# Patient Record
Sex: MALE | Race: BLACK OR AFRICAN AMERICAN | NOT HISPANIC OR LATINO | Employment: FULL TIME | URBAN - METROPOLITAN AREA
[De-identification: names, ages, dates, MRNs, and addresses within clinical notes are randomized per-mention and may not be internally consistent; named-entity substitution may affect disease eponyms.]

---

## 2018-04-11 ENCOUNTER — HOSPITAL ENCOUNTER (OUTPATIENT)
Dept: RADIOLOGY | Facility: HOSPITAL | Age: 38
Discharge: HOME | End: 2018-04-11
Attending: INTERNAL MEDICINE
Payer: COMMERCIAL

## 2018-04-11 ENCOUNTER — OFFICE VISIT (OUTPATIENT)
Dept: PULMONOLOGY | Facility: CLINIC | Age: 38
End: 2018-04-11
Payer: COMMERCIAL

## 2018-04-11 VITALS
HEIGHT: 71 IN | HEART RATE: 50 BPM | OXYGEN SATURATION: 99 % | DIASTOLIC BLOOD PRESSURE: 86 MMHG | SYSTOLIC BLOOD PRESSURE: 122 MMHG | WEIGHT: 201.75 LBS | BODY MASS INDEX: 28.24 KG/M2

## 2018-04-11 DIAGNOSIS — J45.909 CHILDHOOD ASTHMA, UNSPECIFIED ASTHMA SEVERITY, UNSPECIFIED WHETHER COMPLICATED, UNSPECIFIED WHETHER PERSISTENT: ICD-10-CM

## 2018-04-11 DIAGNOSIS — J45.901 EXACERBATION OF ASTHMA, UNSPECIFIED ASTHMA SEVERITY, UNSPECIFIED WHETHER PERSISTENT: ICD-10-CM

## 2018-04-11 DIAGNOSIS — J45.901 EXACERBATION OF ASTHMA, UNSPECIFIED ASTHMA SEVERITY, UNSPECIFIED WHETHER PERSISTENT: Primary | ICD-10-CM

## 2018-04-11 DIAGNOSIS — J30.2 SEASONAL ALLERGIC RHINITIS, UNSPECIFIED CHRONICITY, UNSPECIFIED TRIGGER: ICD-10-CM

## 2018-04-11 DIAGNOSIS — J45.21 MILD INTERMITTENT ASTHMA WITH ACUTE EXACERBATION: ICD-10-CM

## 2018-04-11 PROCEDURE — 71046 X-RAY EXAM CHEST 2 VIEWS: CPT | Mod: 26,,, | Performed by: RADIOLOGY

## 2018-04-11 PROCEDURE — 99204 OFFICE O/P NEW MOD 45 MIN: CPT | Mod: S$GLB,,, | Performed by: INTERNAL MEDICINE

## 2018-04-11 PROCEDURE — 71046 X-RAY EXAM CHEST 2 VIEWS: CPT | Mod: TC,FY,PO

## 2018-04-11 PROCEDURE — 99999 PR PBB SHADOW E&M-NEW PATIENT-LVL III: CPT | Mod: PBBFAC,,, | Performed by: INTERNAL MEDICINE

## 2018-04-11 RX ORDER — LOSARTAN POTASSIUM 100 MG/1
10 TABLET ORAL DAILY
COMMUNITY
End: 2019-10-19 | Stop reason: SDUPTHER

## 2018-04-11 RX ORDER — FLUTICASONE PROPIONATE 50 MCG
1 SPRAY, SUSPENSION (ML) NASAL DAILY
Qty: 16 G | Refills: 4 | Status: SHIPPED | OUTPATIENT
Start: 2018-04-11 | End: 2019-10-19 | Stop reason: SDUPTHER

## 2018-04-11 RX ORDER — PREDNISONE 10 MG/1
TABLET ORAL
Qty: 21 TABLET | Refills: 0 | Status: SHIPPED | OUTPATIENT
Start: 2018-04-11 | End: 2018-05-31 | Stop reason: ALTCHOICE

## 2018-04-11 RX ORDER — MULTIVITAMIN
1 TABLET ORAL DAILY
COMMUNITY

## 2018-04-11 RX ORDER — ALBUTEROL SULFATE 90 UG/1
2 AEROSOL, METERED RESPIRATORY (INHALATION) EVERY 6 HOURS PRN
Qty: 18 G | Refills: 0 | Status: SHIPPED | OUTPATIENT
Start: 2018-04-11 | End: 2019-09-05 | Stop reason: SDUPTHER

## 2018-04-11 NOTE — PROGRESS NOTES
Initial Outpatient Pulmonary Evaluation       SUBJECTIVE:     History of Present Illness:    Patient is a 37 y.o. male presenting for worsening wheezing every night for the last 3 weeks, sob , chest tightness, cough with clear sputum.     Cold air , night time, exercise can precipitate his wheezing.     No recent relocation , Pet exposure , URI.     + nasal congestion, rinorrhea, sneezing, itchy eyes.     + Seasonal allergies this time of the year.     Last time he used Albuterol in 2001.     Child hx of asthma.     Works as .     Never smoker.     No Fhx of asthma.     Chief Complaint   Patient presents with    Wheezing    history of asthma     Review of patient's allergies indicates:  No Known Allergies    Current Outpatient Prescriptions   Medication Sig Dispense Refill    losartan (COZAAR) 100 MG tablet Take 10 mg by mouth once daily.      multivitamin (THERAGRAN) per tablet Take 1 tablet by mouth once daily.      albuterol 90 mcg/actuation inhaler Inhale 2 puffs into the lungs every 6 (six) hours as needed for Wheezing. Rescue 18 g 0    fluticasone (FLONASE) 50 mcg/actuation nasal spray 1 spray (50 mcg total) by Each Nare route once daily. 16 g 4    fluticasone furoate (ARNUITY ELLIPTA) 100 mcg/actuation DsDv Inhale 100 mcg into the lungs once daily. Controller 30 each 3     No current facility-administered medications for this visit.        Past Medical History:   Diagnosis Date    Asthma     Childhood    Hepatitis B 2015    Hyperlipidemia 2011    Hypertension      Past Surgical History:   Procedure Laterality Date    KNEE ARTHROSCOPY Right 1995    LIVER BIOPSY  2017     Family History   Problem Relation Age of Onset    Hypertension Mother     Diabetes Father     Hypertension Father     Diabetes Maternal Grandmother     Heart failure Maternal Grandmother      Social History   Substance Use Topics    Smoking status: Never Smoker     "Smokeless tobacco: Never Used    Alcohol use No        Review of Systems:  Review of Systems   Constitutional: Negative for chills and fever.   HENT: Positive for congestion, postnasal drip, rhinorrhea and sneezing.    Eyes: Positive for itching.   Respiratory: Positive for cough, shortness of breath and wheezing.    Cardiovascular: Negative for chest pain.   Gastrointestinal: Negative for abdominal pain.        Hep B chronic sp liver bx last Nov.    Endocrine: Negative.    Genitourinary: Negative for hematuria.   Musculoskeletal: Positive for back pain. Negative for gait problem.   Skin: Negative for color change.   Allergic/Immunologic: Positive for environmental allergies. Negative for immunocompromised state.   Neurological: Negative for seizures.   Hematological: Negative for adenopathy.   Psychiatric/Behavioral: The patient is not nervous/anxious.        OBJECTIVE:     Vital Signs (Most Recent)  Pulse: (!) 50 (04/11/18 1452)  BP: 122/86 (04/11/18 1452)  SpO2: 99 % (04/11/18 1452)  5' 11" (1.803 m)  91.5 kg (201 lb 11.5 oz)     Physical Exam:  Physical Exam   Constitutional: He is oriented to person, place, and time. He appears well-developed and well-nourished.   HENT:   Head: Normocephalic and atraumatic.   Eyes: EOM are normal.   Neck: Neck supple.   Cardiovascular: Normal rate and regular rhythm.    Pulmonary/Chest: Effort normal. No respiratory distress. He has wheezes. He has no rales. He exhibits no tenderness.   Abdominal: Soft. He exhibits no distension.   Musculoskeletal: He exhibits no edema or deformity.   Neurological: He is alert and oriented to person, place, and time.   Skin: Skin is warm.   Psychiatric: He has a normal mood and affect.       Laboratory    Lab Results   Component Value Date    WBC 4.25 (L) 07/06/2010    HGB 14.1 07/06/2010    HCT 43.0 07/06/2010    MCV 85.8 07/06/2010     07/06/2010     BMP  Lab Results   Component Value Date     07/06/2010    K 4.5 07/06/2010    "  07/06/2010    CO2 26 07/06/2010    BUN 16 07/06/2010    CREATININE 0.9 07/06/2010    CALCIUM 9.5 07/06/2010    ANIONGAP 15 07/06/2010    ESTGFRAFRICA >60 07/06/2010    EGFRNONAA >60 07/06/2010     BNP  @LABRCNTIP(BNP,BNPTRIAGEBLO)@  Lab Results   Component Value Date    TSH 0.540 05/19/2010     ABG  @LABRCNTIP(PH,PO2,PCO2,HCO3,BE)@    Diagnostic Results:  Liver, core needle biopsy:                                               - Chronic hepatitis with minimal activity and no significant           fibrosis, consistent with chronic hepatitis B, see Comment and          synoptic.                          ASSESSMENT/PLAN:     Exacerbation of asthma, unspecified asthma severity, unspecified whether persistent  -     fluticasone furoate (ARNUITY ELLIPTA) 100 mcg/actuation DsDv; Inhale 100 mcg into the lungs once daily. Controller  Dispense: 30 each; Refill: 3  -     albuterol 90 mcg/actuation inhaler; Inhale 2 puffs into the lungs every 6 (six) hours as needed for Wheezing. Rescue  Dispense: 18 g; Refill: 0  -     X-Ray Chest PA And Lateral; Future; Expected date: 04/11/2018    Mild intermittent asthma with acute exacerbation  -     fluticasone furoate (ARNUITY ELLIPTA) 100 mcg/actuation DsDv; Inhale 100 mcg into the lungs once daily. Controller  Dispense: 30 each; Refill: 3  -     albuterol 90 mcg/actuation inhaler; Inhale 2 puffs into the lungs every 6 (six) hours as needed for Wheezing. Rescue  Dispense: 18 g; Refill: 0  -     Complete PFT with bronchodilator; Future; Expected date: 05/30/2018    Seasonal allergic rhinitis, unspecified chronicity, unspecified trigger  -     fluticasone (FLONASE) 50 mcg/actuation nasal spray; 1 spray (50 mcg total) by Each Nare route once daily.  Dispense: 16 g; Refill: 4    Childhood asthma, unspecified asthma severity, unspecified whether complicated, unspecified whether persistent  -     Complete PFT with bronchodilator; Future; Expected date: 05/30/2018      Follow-up in  about 6 weeks (around 5/23/2018).    This note was prepared using voice recognition system and is likely to have sound alike errors that may have been overlooked even after proof reading.  Please call me with any questions    Discussed diagnosis, its evaluation, treatment and usual course. All questions answered.    Thank you for the courtesy of participating in the care of this patient    Elias Novak MD

## 2018-04-13 ENCOUNTER — TELEPHONE (OUTPATIENT)
Dept: PULMONOLOGY | Facility: CLINIC | Age: 38
End: 2018-04-13

## 2018-04-13 NOTE — TELEPHONE ENCOUNTER
----- Message from Elias Novak MD sent at 4/12/2018 12:22 PM CDT -----  Plz inform patient CXR was okay.   Thx

## 2018-05-31 ENCOUNTER — OFFICE VISIT (OUTPATIENT)
Dept: PULMONOLOGY | Facility: CLINIC | Age: 38
End: 2018-05-31
Payer: COMMERCIAL

## 2018-05-31 ENCOUNTER — PROCEDURE VISIT (OUTPATIENT)
Dept: PULMONOLOGY | Facility: CLINIC | Age: 38
End: 2018-05-31
Payer: COMMERCIAL

## 2018-05-31 VITALS
RESPIRATION RATE: 18 BRPM | BODY MASS INDEX: 28.14 KG/M2 | DIASTOLIC BLOOD PRESSURE: 83 MMHG | HEART RATE: 62 BPM | WEIGHT: 201 LBS | HEIGHT: 71 IN | SYSTOLIC BLOOD PRESSURE: 120 MMHG | OXYGEN SATURATION: 98 %

## 2018-05-31 DIAGNOSIS — J30.2 SEASONAL ALLERGIC RHINITIS, UNSPECIFIED TRIGGER: ICD-10-CM

## 2018-05-31 DIAGNOSIS — J45.909 CHILDHOOD ASTHMA, UNSPECIFIED ASTHMA SEVERITY, UNSPECIFIED WHETHER COMPLICATED, UNSPECIFIED WHETHER PERSISTENT: ICD-10-CM

## 2018-05-31 DIAGNOSIS — J45.990 EXERCISE-INDUCED ASTHMA: ICD-10-CM

## 2018-05-31 DIAGNOSIS — J45.30 MILD PERSISTENT ASTHMA WITHOUT COMPLICATION: Primary | ICD-10-CM

## 2018-05-31 DIAGNOSIS — J45.21 MILD INTERMITTENT ASTHMA WITH ACUTE EXACERBATION: ICD-10-CM

## 2018-05-31 LAB
BRPFT: ABNORMAL
DLCO ADJ PRE: 31.98 ML/(MIN*MMHG) (ref 27.29–41.15)
DLCO PRE: 31.98 ML/(MIN*MMHG) (ref 27.29–41.15)
DLCO SINGLE BREATH LLN: 27.29
DLCO SINGLE BREATH PRE REF: 93.4 %
DLCO SINGLE BREATH REF: 34.22
DLCOC SBVA LLN: 3.48
DLCOC SBVA PRE REF: 121.6 %
DLCOC SBVA REF: 4.69
DLCOC SINGLE BREATH LLN: 27.29
DLCOC SINGLE BREATH PRE REF: 93.4 %
DLCOC SINGLE BREATH REF: 34.22
DLCOVA LLN: 3.48
DLCOVA PRE REF: 121.6 %
DLCOVA PRE: 5.7 ML/(MIN*MMHG*L) (ref 3.48–5.89)
DLCOVA REF: 4.69
DLVAADJ PRE: 5.7 ML/(MIN*MMHG*L) (ref 3.48–5.89)
ERV LLN: 1.5
ERV PRE REF: 125.8 %
ERV PRE: 1.89 L (ref 1.5–1.5)
ERV REF: 1.5
ERVN2 LLN: 1.5
ERVN2 REF: 1.5
FEF 25 75 CHG: 37.9 %
FEF 25 75 LLN: 1.96
FEF 25 75 POST REF: 69.4 %
FEF 25 75 POST: 2.58 L/S (ref 1.96–5.47)
FEF 25 75 PRE REF: 50.4 %
FEF 25 75 PRE: 1.87 L/S (ref 1.96–5.47)
FEF 25 75 REF: 3.71
FET100 CHG: -26.6 %
FET100 POST: 12.38 SEC
FET100 PRE: 16.86 SEC
FEV1 CHG: 6.5 %
FEV1 FVC CHG: 7.4 %
FEV1 FVC LLN: 71
FEV1 FVC POST REF: 89.2 %
FEV1 FVC POST: 72.73 % (ref 71.42–91.62)
FEV1 FVC PRE REF: 83.1 %
FEV1 FVC PRE: 67.72 % (ref 71.42–91.62)
FEV1 FVC REF: 82
FEV1 LLN: 2.87
FEV1 POST REF: 96.2 %
FEV1 POST: 3.6 L (ref 2.87–4.6)
FEV1 PRE REF: 90.3 %
FEV1 PRE: 3.38 L (ref 2.87–4.6)
FEV1 REF: 3.74
FEV6 CHG: 4.6 %
FEV6 LLN: 3.52
FEV6 POST REF: 105.4 %
FEV6 POST: 4.71 L (ref 3.52–5.41)
FEV6 PRE REF: 100.7 %
FEV6 PRE: 4.5 L (ref 3.52–5.41)
FEV6 REF: 4.46
FRCN2 LLN: 2.48
FRCN2 REF: 3.46
FRCPL PRE: 4.19 L
FRCPLETH LLN: 2.48
FRCPLETH PREREF: 121 %
FRCPLETH REF: 3.46
FVC CHG: -0.8 %
FVC LLN: 3.59
FVC POST REF: 107.5 %
FVC POST: 4.94 L (ref 3.59–5.6)
FVC PRE REF: 108.4 %
FVC PRE: 4.98 L (ref 3.59–5.6)
FVC REF: 4.6
IVC PRE: 4.37 L (ref 3.59–5.6)
IVC SINGLE BREATH LLN: 3.59
IVC SINGLE BREATH PRE REF: 95.1 %
IVC SINGLE BREATH REF: 4.6
MVV LLN: 141
MVV REF: 166
PEF CHG: 13.5 %
PEF LLN: 6.81
PEF POST REF: 94.8 %
PEF POST: 9.03 L/S (ref 6.81–12.25)
PEF PRE REF: 83.5 %
PEF PRE: 7.96 L/S (ref 6.81–12.25)
PEF REF: 9.53
RAW LLN: 3.06
RAW PRE REF: 66 %
RAW PRE: 2.02 CMH2O*S/L (ref 3.06–3.06)
RAW REF: 3.06
RV LLN: 1.29
RV PRE REF: 118.4 %
RV PRE: 2.33 L (ref 1.29–2.64)
RV REF: 1.96
RVN2 LLN: 1.29
RVN2 REF: 1.96
RVN2TLCN2 LLN: 20
RVN2TLCN2 REF: 29
RVTLC LLN: 20
RVTLC PRE REF: 110.6 %
RVTLC PRE: 31.82 % (ref 19.8–37.76)
RVTLC REF: 29
TLC LLN: 6.15
TLC PRE REF: 100.1 %
TLC PRE: 7.31 L (ref 6.15–8.45)
TLC REF: 7.3
TLCN2 LLN: 6.15
TLCN2 REF: 7.3
VA PRE: 5.62 L (ref 5.62–8.35)
VA SINGLE BREATH LLN: 5.62
VA SINGLE BREATH PRE REF: 80.4 %
VA SINGLE BREATH REF: 6.99
VC LLN: 3.59
VC PRE REF: 108.4 %
VC PRE: 4.98 L (ref 3.59–5.6)
VC REF: 4.6
VCMAXN2 LLN: 3.59
VCMAXN2 REF: 4.6
VTGRAWPRE: 4.93 L

## 2018-05-31 PROCEDURE — 94729 DIFFUSING CAPACITY: CPT | Mod: S$GLB,,, | Performed by: INTERNAL MEDICINE

## 2018-05-31 PROCEDURE — 3008F BODY MASS INDEX DOCD: CPT | Mod: CPTII,S$GLB,, | Performed by: INTERNAL MEDICINE

## 2018-05-31 PROCEDURE — 99999 PR PBB SHADOW E&M-EST. PATIENT-LVL III: CPT | Mod: PBBFAC,,, | Performed by: INTERNAL MEDICINE

## 2018-05-31 PROCEDURE — 99214 OFFICE O/P EST MOD 30 MIN: CPT | Mod: 25,S$GLB,, | Performed by: INTERNAL MEDICINE

## 2018-05-31 PROCEDURE — 94726 PLETHYSMOGRAPHY LUNG VOLUMES: CPT | Mod: S$GLB,,, | Performed by: INTERNAL MEDICINE

## 2018-05-31 PROCEDURE — 94060 EVALUATION OF WHEEZING: CPT | Mod: S$GLB,,, | Performed by: INTERNAL MEDICINE

## 2018-05-31 NOTE — PROGRESS NOTES
Pulmonary Outpatient Follow Up Visit     Subjective:       Patient ID: Andrew Allen is a 37 y.o. male.    Chief Complaint: Asthma and Wheezing      HPI  37-year-old male presenting for follow-up.  He was evaluated on April 11, 2018 initially for worsening allergic rhinitis wheezing shortness of breath coughing and frequent nighttime symptoms.  He was prescribed a prednisone taper and started on inhaled steroid, albuterol as needed and nasal steroid spray.  He is using albuterol pre exercise and he is having no symptoms during exercise.  Today he feels significantly better seldomly using albuterol, nasal congestion or rhinorrhea, sneezing and postnasal drip resolved.  Review of Systems   Constitutional: Negative for fever and chills.   HENT: Negative for nosebleeds and congestion.    Eyes: Negative for redness.   Respiratory: Negative for cough, shortness of breath and asthma nighttime symptoms.    Cardiovascular: Negative for chest pain.   Genitourinary: Negative for hematuria.   Endocrine: Negative for polyphagia.    Musculoskeletal: Positive for back pain. Negative for gait problem.   Skin: Negative for rash.   Gastrointestinal: Negative for abdominal distention.         Hep B chronic sp liver bx last Nov.   Neurological: Negative for syncope.   Hematological: Negative for adenopathy.   Psychiatric/Behavioral: Negative for confusion. The patient is not nervous/anxious.          Outpatient Encounter Prescriptions as of 5/31/2018   Medication Sig Dispense Refill    albuterol 90 mcg/actuation inhaler Inhale 2 puffs into the lungs every 6 (six) hours as needed for Wheezing. Rescue 18 g 0    fluticasone (FLONASE) 50 mcg/actuation nasal spray 1 spray (50 mcg total) by Each Nare route once daily. 16 g 4    fluticasone furoate (ARNUITY ELLIPTA) 100 mcg/actuation DsDv Inhale 100 mcg into the lungs once daily. Controller 30 each 3    losartan (COZAAR) 100 MG tablet Take 10  mg by mouth once daily.      multivitamin (THERAGRAN) per tablet Take 1 tablet by mouth once daily.      [DISCONTINUED] predniSONE (DELTASONE) 10 MG tablet Prednisone 40 mg daily for 3 days then 20 mg daily for 3 days then 10 mg daily for 3 days 21 tablet 0     No facility-administered encounter medications on file as of 5/31/2018.        Objective:          Physical Exam   Constitutional: He is oriented to person, place, and time. He appears well-developed and well-nourished.   HENT:   Head: Normocephalic.   Mouth/Throat: Mallampati Score: I.   Neck: Neck supple.   Cardiovascular: Normal rate and regular rhythm.    Pulmonary/Chest: Normal expansion and effort normal.   Abdominal: Soft.   Musculoskeletal: He exhibits no edema.   Lymphadenopathy:     He has no cervical adenopathy.   Neurological: He is alert and oriented to person, place, and time.   Skin: Skin is warm.   Psychiatric: He has a normal mood and affect.       Laboratory    Lab Results   Component Value Date    WBC 4.25 (L) 07/06/2010    HGB 14.1 07/06/2010    HCT 43.0 07/06/2010    MCV 85.8 07/06/2010     07/06/2010     BMP  Lab Results   Component Value Date     07/06/2010    K 4.5 07/06/2010     07/06/2010    CO2 26 07/06/2010    BUN 16 07/06/2010    CREATININE 0.9 07/06/2010    CALCIUM 9.5 07/06/2010    ANIONGAP 15 07/06/2010    ESTGFRAFRICA >60 07/06/2010    EGFRNONAA >60 07/06/2010     BNP  @LABRCNTIP(BNP,BNPTRIAGEBLO)@  Lab Results   Component Value Date    TSH 0.540 05/19/2010       Diagnostic Results:    Chest x-ray April 11, 2018 clear lungs.  X-ray was personally reviewed    PFT May 31, 2018 small airways flow obstruction.  Mild obstruction.  No significant broncho reactivity.  PFT was personally reviewed  Assessment/Plan:   Mild persistent asthma without complication    Seasonal allergic rhinitis, unspecified trigger    Exercise-induced asthma      Continue albuterol as needed, continue Arnuity 100 mcg 1 puff  daily.    Continue on steroid nasal spray.    Patient advised to use albuterol pre exercise if needed.      Follow-up in about 3 months (around 8/31/2018).    This note was prepared using voice recognition system and is likely to have sound alike errors that may have been overlooked even after proof reading.  Please call me with any questions    Discussed diagnosis, its evaluation, treatment and usual course. All questions answered.    Thank you for the courtesy of participating in the care of this patient    Elias Novak MD

## 2019-09-05 DIAGNOSIS — J45.21 MILD INTERMITTENT ASTHMA WITH ACUTE EXACERBATION: ICD-10-CM

## 2019-09-05 DIAGNOSIS — J45.901 EXACERBATION OF ASTHMA, UNSPECIFIED ASTHMA SEVERITY, UNSPECIFIED WHETHER PERSISTENT: ICD-10-CM

## 2019-09-05 RX ORDER — ALBUTEROL SULFATE 90 UG/1
AEROSOL, METERED RESPIRATORY (INHALATION)
Qty: 18 INHALER | Refills: 0 | Status: SHIPPED | OUTPATIENT
Start: 2019-09-05 | End: 2019-10-19 | Stop reason: SDUPTHER

## 2019-10-19 ENCOUNTER — OFFICE VISIT (OUTPATIENT)
Dept: INTERNAL MEDICINE | Facility: CLINIC | Age: 39
End: 2019-10-19
Payer: COMMERCIAL

## 2019-10-19 ENCOUNTER — LAB VISIT (OUTPATIENT)
Dept: LAB | Facility: HOSPITAL | Age: 39
End: 2019-10-19
Payer: COMMERCIAL

## 2019-10-19 ENCOUNTER — LAB VISIT (OUTPATIENT)
Dept: LAB | Facility: HOSPITAL | Age: 39
End: 2019-10-19
Attending: FAMILY MEDICINE
Payer: COMMERCIAL

## 2019-10-19 VITALS
OXYGEN SATURATION: 98 % | TEMPERATURE: 97 F | DIASTOLIC BLOOD PRESSURE: 86 MMHG | HEART RATE: 71 BPM | WEIGHT: 241.63 LBS | BODY MASS INDEX: 33.83 KG/M2 | HEIGHT: 71 IN | SYSTOLIC BLOOD PRESSURE: 128 MMHG

## 2019-10-19 DIAGNOSIS — Z13.1 SCREENING FOR DIABETES MELLITUS: ICD-10-CM

## 2019-10-19 DIAGNOSIS — Z00.00 PREVENTATIVE HEALTH CARE: ICD-10-CM

## 2019-10-19 DIAGNOSIS — J45.20 MILD INTERMITTENT ASTHMA WITHOUT COMPLICATION: Chronic | ICD-10-CM

## 2019-10-19 DIAGNOSIS — Z11.4 SCREENING FOR HIV WITHOUT PRESENCE OF RISK FACTORS: ICD-10-CM

## 2019-10-19 DIAGNOSIS — Z13.220 SCREENING FOR LIPID DISORDERS: ICD-10-CM

## 2019-10-19 DIAGNOSIS — Z11.3 ROUTINE SCREENING FOR STI (SEXUALLY TRANSMITTED INFECTION): ICD-10-CM

## 2019-10-19 DIAGNOSIS — J30.1 SEASONAL ALLERGIC RHINITIS DUE TO POLLEN: ICD-10-CM

## 2019-10-19 DIAGNOSIS — Z23 NEED FOR DIPHTHERIA-TETANUS-PERTUSSIS (TDAP) VACCINE: ICD-10-CM

## 2019-10-19 DIAGNOSIS — Z23 NEED FOR 23-POLYVALENT PNEUMOCOCCAL POLYSACCHARIDE VACCINE: ICD-10-CM

## 2019-10-19 DIAGNOSIS — Z23 NEED FOR INFLUENZA VACCINATION: ICD-10-CM

## 2019-10-19 DIAGNOSIS — I10 ESSENTIAL HYPERTENSION: Chronic | ICD-10-CM

## 2019-10-19 DIAGNOSIS — Z00.00 PREVENTATIVE HEALTH CARE: Primary | ICD-10-CM

## 2019-10-19 DIAGNOSIS — B18.1 CHRONIC VIRAL HEPATITIS B: Chronic | ICD-10-CM

## 2019-10-19 DIAGNOSIS — R11.2 NON-INTRACTABLE VOMITING WITH NAUSEA, UNSPECIFIED VOMITING TYPE: ICD-10-CM

## 2019-10-19 PROBLEM — R79.89 ABNORMAL LIVER FUNCTION TESTS: Status: ACTIVE | Noted: 2019-10-19

## 2019-10-19 PROBLEM — J45.30 MILD PERSISTENT ASTHMA WITHOUT COMPLICATION: Chronic | Status: ACTIVE | Noted: 2019-10-19

## 2019-10-19 LAB
ALBUMIN SERPL BCP-MCNC: 4 G/DL (ref 3.5–5.2)
ALP SERPL-CCNC: 62 U/L (ref 55–135)
ALT SERPL W/O P-5'-P-CCNC: 16 U/L (ref 10–44)
ANION GAP SERPL CALC-SCNC: 8 MMOL/L (ref 8–16)
AST SERPL-CCNC: 16 U/L (ref 10–40)
BILIRUB SERPL-MCNC: 0.7 MG/DL (ref 0.1–1)
BUN SERPL-MCNC: 14 MG/DL (ref 6–20)
CALCIUM SERPL-MCNC: 9.5 MG/DL (ref 8.7–10.5)
CHLORIDE SERPL-SCNC: 103 MMOL/L (ref 95–110)
CHOLEST SERPL-MCNC: 236 MG/DL (ref 120–199)
CHOLEST/HDLC SERPL: 3.6 {RATIO} (ref 2–5)
CO2 SERPL-SCNC: 26 MMOL/L (ref 23–29)
CREAT SERPL-MCNC: 1 MG/DL (ref 0.5–1.4)
EST. GFR  (AFRICAN AMERICAN): >60 ML/MIN/1.73 M^2
EST. GFR  (NON AFRICAN AMERICAN): >60 ML/MIN/1.73 M^2
GLUCOSE SERPL-MCNC: 85 MG/DL (ref 70–110)
HDLC SERPL-MCNC: 66 MG/DL (ref 40–75)
HDLC SERPL: 28 % (ref 20–50)
LDLC SERPL CALC-MCNC: 162.6 MG/DL (ref 63–159)
NONHDLC SERPL-MCNC: 170 MG/DL
POTASSIUM SERPL-SCNC: 4.3 MMOL/L (ref 3.5–5.1)
PROT SERPL-MCNC: 7.9 G/DL (ref 6–8.4)
SODIUM SERPL-SCNC: 137 MMOL/L (ref 136–145)
TRIGL SERPL-MCNC: 37 MG/DL (ref 30–150)

## 2019-10-19 PROCEDURE — 90471 FLU VACCINE (QUAD) GREATER THAN OR EQUAL TO 3YO PRESERVATIVE FREE IM: ICD-10-PCS | Mod: S$GLB,,, | Performed by: FAMILY MEDICINE

## 2019-10-19 PROCEDURE — 90715 TDAP VACCINE 7 YRS/> IM: CPT | Mod: S$GLB,,, | Performed by: FAMILY MEDICINE

## 2019-10-19 PROCEDURE — 90686 FLU VACCINE (QUAD) GREATER THAN OR EQUAL TO 3YO PRESERVATIVE FREE IM: ICD-10-PCS | Mod: S$GLB,,, | Performed by: FAMILY MEDICINE

## 2019-10-19 PROCEDURE — 87491 CHLMYD TRACH DNA AMP PROBE: CPT

## 2019-10-19 PROCEDURE — 99999 PR PBB SHADOW E&M-EST. PATIENT-LVL III: CPT | Mod: PBBFAC,,, | Performed by: FAMILY MEDICINE

## 2019-10-19 PROCEDURE — 80053 COMPREHEN METABOLIC PANEL: CPT

## 2019-10-19 PROCEDURE — 90472 PNEUMOCOCCAL POLYSACCHARIDE VACCINE 23-VALENT =>2YO SQ IM: ICD-10-PCS | Mod: S$GLB,,, | Performed by: FAMILY MEDICINE

## 2019-10-19 PROCEDURE — 90472 IMMUNIZATION ADMIN EACH ADD: CPT | Mod: S$GLB,,, | Performed by: FAMILY MEDICINE

## 2019-10-19 PROCEDURE — 90715 TDAP VACCINE GREATER THAN OR EQUAL TO 7YO IM: ICD-10-PCS | Mod: S$GLB,,, | Performed by: FAMILY MEDICINE

## 2019-10-19 PROCEDURE — 99386 PREV VISIT NEW AGE 40-64: CPT | Mod: 25,S$GLB,, | Performed by: FAMILY MEDICINE

## 2019-10-19 PROCEDURE — 99386 PR PREVENTIVE VISIT,NEW,40-64: ICD-10-PCS | Mod: 25,S$GLB,, | Performed by: FAMILY MEDICINE

## 2019-10-19 PROCEDURE — 80061 LIPID PANEL: CPT

## 2019-10-19 PROCEDURE — 86703 HIV-1/HIV-2 1 RESULT ANTBDY: CPT

## 2019-10-19 PROCEDURE — 99999 PR PBB SHADOW E&M-EST. PATIENT-LVL III: ICD-10-PCS | Mod: PBBFAC,,, | Performed by: FAMILY MEDICINE

## 2019-10-19 PROCEDURE — 90732 PNEUMOCOCCAL POLYSACCHARIDE VACCINE 23-VALENT =>2YO SQ IM: ICD-10-PCS | Mod: S$GLB,,, | Performed by: FAMILY MEDICINE

## 2019-10-19 PROCEDURE — 86592 SYPHILIS TEST NON-TREP QUAL: CPT

## 2019-10-19 PROCEDURE — 90686 IIV4 VACC NO PRSV 0.5 ML IM: CPT | Mod: S$GLB,,, | Performed by: FAMILY MEDICINE

## 2019-10-19 PROCEDURE — 36415 COLL VENOUS BLD VENIPUNCTURE: CPT

## 2019-10-19 PROCEDURE — 90732 PPSV23 VACC 2 YRS+ SUBQ/IM: CPT | Mod: S$GLB,,, | Performed by: FAMILY MEDICINE

## 2019-10-19 PROCEDURE — 90471 IMMUNIZATION ADMIN: CPT | Mod: S$GLB,,, | Performed by: FAMILY MEDICINE

## 2019-10-19 RX ORDER — LOSARTAN POTASSIUM 100 MG/1
100 TABLET ORAL DAILY
Qty: 90 TABLET | Refills: 4 | Status: SHIPPED | OUTPATIENT
Start: 2019-10-19 | End: 2020-07-06

## 2019-10-19 RX ORDER — FLUTICASONE PROPIONATE 50 MCG
2 SPRAY, SUSPENSION (ML) NASAL DAILY
Qty: 3 BOTTLE | Refills: 4 | Status: SHIPPED | OUTPATIENT
Start: 2019-10-19 | End: 2020-04-22 | Stop reason: SDUPTHER

## 2019-10-19 RX ORDER — ATORVASTATIN CALCIUM 80 MG/1
TABLET, FILM COATED ORAL
COMMUNITY
End: 2019-10-19

## 2019-10-19 RX ORDER — ALBUTEROL SULFATE 90 UG/1
2 AEROSOL, METERED RESPIRATORY (INHALATION) EVERY 8 HOURS PRN
Qty: 1 INHALER | Refills: 2 | Status: SHIPPED | OUTPATIENT
Start: 2019-10-19 | End: 2020-03-17 | Stop reason: SDUPTHER

## 2019-10-19 NOTE — PATIENT INSTRUCTIONS
If you need help with [facility] and [facility], help is available:  · online at [email or web address]   at the O-bar in the 1st floor lobby of [facility]  · or by phone at [phone]  · by email at  [email or web address]    Here's a link to a [employer]uru[employer] video on how to send a message to your provider using [facility] kavitha.  [email or web address]    Here's a link to a [employer] video on how to schedule an appointment using [facility] kavitha.  [email or web address]       Enroll in [facility] program and you can use an electronic blood pressure monitor to measure your blood pressure at home. Those numbers are automatically sent by your smartphone to your hypertension care team for review. You will also receive in-depth education on high blood pressure and the lifestyle changes you can make to improve your blood pressure.    Enrolling is easy!  Stop by the O-Bar in the 1st floor lobby of [facility].  You can also call the [facility] Help Desk at [phone] to get started.

## 2019-10-19 NOTE — PROGRESS NOTES
CHIEF COMPLAINT  Annual Exam and Nausea    This is my first time treating him here. All problems addressed today are NEW TO ME.  He is requesting that I take over as his primary care provider.     HEALTH MAINTENANCE INTERVENTIONS - UP TO DATE  The patient has no Health Maintenance topics of status Not Due    HEALTH MAINTENANCE INTERVENTIONS - DUE OR DUE SOON  Health Maintenance Due   Topic Date Due    Lipid Panel  1979    TETANUS VACCINE  10/15/1997    Pneumococcal Vaccine (Medium Risk) (1 of 1 - PPSV23) 10/15/1998    Influenza Vaccine (1) 09/01/2019       DIAGNOSES.   1. Preventative health care    2. Screening for lipid disorders    3. Screening for diabetes mellitus    4. Screening for HIV without presence of risk factors    5. Routine screening for STI (sexually transmitted infection)    6. Need for diphtheria-tetanus-pertussis (Tdap) vaccine    7. Need for influenza vaccination    8. Need for 23-polyvalent pneumococcal polysaccharide vaccine      9. Chronic viral hepatitis B       ASSESSMENT: Followed by GI.     10. Non-intractable vomiting with nausea, unspecified vomiting type        ASSESSMENT: He reports mild persistent nausea with occasional episodes of emesis since starting Vemlidy 3-4 weeks ago. He agreed to discuss with his gastroenterologist.     11. Essential hypertension        ASSESSMENT: Well Controlled.      12. Mild intermittent asthma without complication        ASSESSMENT: Well Controlled. Needs albuterol on average fewer than 6 times per month.     13. Seasonal allergic rhinitis due to pollen        ASSESSMENT: He describes typical chronic seasonal nasal congestion and rhinorrhea exacerbated by pollen and changes in weather, well controlled with nasal steroids.         ORDER SUMMARY  Orders Placed This Encounter    C. trachomatis/N. gonorrhoeae by AMP DNA [facility]; Urine    Pneumococcal Polysaccharide Vaccine (23 Valent) (SQ/IM)    Tdap Vaccine    Comprehensive metabolic panel     Lipid panel    HIV 1/2 Ag/Ab (4th Gen)    RPR    Hypertension Digital Medicine (HDMP) Enrollment Order    albuterol (VENTOLIN HFA) 90 mcg/actuation inhaler    fluticasone propionate (FLONASE) 50 mcg/actuation nasal spray    losartan (COZAAR) 100 MG tablet    Hypertension Digital Medicine (HDMP): Assign Onboarding Questionnaires       Problem List Items Addressed This Visit        Pulmonary    Mild intermittent asthma without complication (Chronic)    Relevant Medications    albuterol (VENTOLIN HFA) 90 mcg/actuation inhaler       Cardiac/Vascular    Essential hypertension (Chronic)    Relevant Medications    losartan (COZAAR) 100 MG tablet    Other Relevant Orders    Hypertension Digital Medicine (HDMP) Enrollment Order (Completed)    Hypertension Digital Medicine (HDMP): Assign Onboarding Questionnaires (Completed)       GI    Chronic viral hepatitis B (Chronic)    Overview      GI: Jovanni Loera MD    [address] [place] [id number]              Other Visit Diagnoses     Preventative health care    -  Primary    Relevant Orders    Comprehensive metabolic panel    Lipid panel    HIV 1/2 Ag/Ab (4th Gen)    RPR    C. trachomatis/N. gonorrhoeae by AMP DNA [facility]; Urine    Pneumococcal Polysaccharide Vaccine (23 Valent) (SQ/IM)    Tdap Vaccine    Screening for lipid disorders        Relevant Orders    Lipid panel    Screening for diabetes mellitus        Relevant Orders    Comprehensive metabolic panel    Screening for HIV without presence of risk factors        Relevant Orders    HIV 1/2 Ag/Ab (4th Gen)    Routine screening for STI (sexually transmitted infection)        Relevant Orders    RPR    C. trachomatis/N. gonorrhoeae by AMP DNA [facility]; Urine    Need for diphtheria-tetanus-pertussis (Tdap) vaccine        Relevant Orders    Tdap Vaccine    Need for influenza vaccination        Need for 23-polyvalent pneumococcal polysaccharide vaccine        Relevant Orders    Pneumococcal  Polysaccharide Vaccine (23 Valent) (SQ/IM)    Non-intractable vomiting with nausea, unspecified vomiting type        Seasonal allergic rhinitis due to pollen        Relevant Medications    fluticasone propionate (FLONASE) 50 mcg/actuation nasal spray          Outpatient Medications Prior to Visit   Medication Sig Dispense Refill    multivitamin (THERAGRAN) per tablet Take 1 tablet by mouth once daily.      tenofovir alafenamide (VEMLIDY) 25 mg Tab Vemlidy 25 mg tablet   Take 1 tablet every day by oral route for 30 days.      fluticasone (FLONASE) 50 mcg/actuation nasal spray 1 spray (50 mcg total) by Each Nare route once daily. 16 g 4    losartan (COZAAR) 100 MG tablet Take 10 mg by mouth once daily.      VENTOLIN HFA 90 mcg/actuation inhaler INHALE 2 PUFFS INTO THE LUNGS EVERY 6 (SIX) HOURS AS NEEDED FOR WHEEZING. RESCUE 18 Inhaler 0    atorvastatin (LIPITOR) 80 MG tablet atorvastatin 80 mg tablet   TAKE 1 TAB BY MOUTH ONCE A DAY      fluticasone furoate (ARNUITY ELLIPTA) 100 mcg/actuation DsDv Inhale 100 mcg into the lungs once daily. Controller 30 each 3     No facility-administered medications prior to visit.         Medications Ordered This Encounter   Medications    albuterol (VENTOLIN HFA) 90 mcg/actuation inhaler     Sig: Inhale 2 puffs into the lungs every 8 (eight) hours as needed for Wheezing. (RESCUE INHALER)     Dispense:  1 Inhaler     Refill:  2    fluticasone propionate (FLONASE) 50 mcg/actuation nasal spray     Si sprays (100 mcg total) by Each Nostril route once daily.     Dispense:  3 Bottle     Refill:  4    losartan (COZAAR) 100 MG tablet     Sig: Take 1 tablet (100 mg total) by mouth once daily.     Dispense:  90 tablet     Refill:  4       Medications Discontinued During This Encounter   Medication Reason    fluticasone furoate (ARNUITY ELLIPTA) 100 mcg/actuation DsDv Patient no longer taking    VENTOLIN HFA 90 mcg/actuation inhaler Reorder    atorvastatin (LIPITOR) 80 MG  "tablet Patient no longer taking    fluticasone (FLONASE) 50 mcg/actuation nasal spray Reorder    losartan (COZAAR) 100 MG tablet Reorder        Follow up in about 1 year (around 10/19/2020), or if symptoms worsen or fail to improve, for wellness and preventive services.      REVIEW OF SYSTEMS  CONSTITUTIONAL: No fever reported.  CARDIOVASCULAR: No angina reported.  PULMONARY: No hemoptysis reported.  PSYCHIATRIC: No mood instability reported.  NEUROLOGIC: No seizures reported.  ENDOCRINE: No polydipsia reported.  GASTROINTESTINAL: No hematemesis, BRBPR or melena reported.   GENITOURINARY: No hematuria reported.  ENT: No acute hearing changes reported.  OPHTHALMOLOGIC: No acute vision changes reported.  HEMATOLOGIC: No bleeding problems reported.  MUSCULOSKELETAL: No recent injuries reported.  DERMATOLOGIC: No rash reported.  REMAINDER OF COMPLETE REVIEW OF SYSTEMS is negative or noncontributory to present illness except as noted herein.     PHYSICAL EXAM  Vitals:    10/19/19 0856   BP: 128/86   BP Location: Right arm   Patient Position: Sitting   BP Method: Large (Manual)   Pulse: 71   Temp: 96.8 °F (36 °C)   TempSrc: Tympanic   SpO2: 98%   Weight: 109.6 kg (241 lb 10 oz)   Height: 5' 11" (1.803 m)     CONSTITUTIONAL: Vital signs noted. No apparent distress. Does not appear acutely ill or septic. Appears adequately hydrated.  EYE: Sclerae anicteric. Lids and conjunctiva unremarkable.  ENT: External ENT unremarkable. Oropharynx moist.  NECK: Trachea midline. Thyroid nontender.  PULM: Lungs clear. Breathing unlabored.  CV: Auscultation reveals regular rate and rhythm without murmur, gallop or rub. No carotid bruit.  GI: Soft and nontender. Bowel sounds normal.  DERM: Skin warm and moist with normal turgor.  PSYCH: Alert and oriented x 3. Mood is grossly neutral. Affect appropriate. Judgment and insight not grossly compromised.      PAST MEDICAL HISTORY  He has a past medical history of Asthma, Chronic viral " hepatitis B, Essential hypertension, Hepatitis B, Hyperlipidemia, Hypertension, Mild intermittent asthma without complication, and Mild persistent asthma without complication.    SURGICAL HISTORY  He has a past surgical history that includes Knee arthroscopy (Right, 1995) and Liver biopsy (2017).    FAMILY HISTORY  His family history includes Diabetes in his father and maternal grandmother; Heart failure in his maternal grandmother; Hypertension in his father and mother.     ALLERGIES  Review of patient's allergies indicates:  No Known Allergies    SOCIAL HISTORY   TOBACCO USE HISTORY: He reports that he has never smoked. He has never used smokeless tobacco.   ALCOHOL USE HISTORY:  He reports that he does not drink alcohol.   RECREATIONAL DRUG USE HISTORY:  He has no drug history on file.    ABOUT THIS DOCUMENTATION:  · Documentation entered by me for this encounter was done in part using speech-recognition technology. Although I have made an effort to ensure accuracy, malapropisms may exist and should be interpreted in context.                        HILDA Mike MD

## 2019-10-21 LAB
C TRACH DNA SPEC QL NAA+PROBE: NOT DETECTED
HIV 1+2 AB+HIV1 P24 AG SERPL QL IA: NEGATIVE
N GONORRHOEA DNA SPEC QL NAA+PROBE: NOT DETECTED
RPR SER QL: NORMAL

## 2020-03-16 DIAGNOSIS — I10 ESSENTIAL HYPERTENSION: Chronic | ICD-10-CM

## 2020-03-16 DIAGNOSIS — J45.20 MILD INTERMITTENT ASTHMA WITHOUT COMPLICATION: Chronic | ICD-10-CM

## 2020-03-16 DIAGNOSIS — J30.1 SEASONAL ALLERGIC RHINITIS DUE TO POLLEN: ICD-10-CM

## 2020-03-16 RX ORDER — FLUTICASONE PROPIONATE 50 MCG
2 SPRAY, SUSPENSION (ML) NASAL DAILY
Status: CANCELLED | OUTPATIENT
Start: 2020-03-16

## 2020-03-16 RX ORDER — ALBUTEROL SULFATE 90 UG/1
2 AEROSOL, METERED RESPIRATORY (INHALATION) EVERY 8 HOURS PRN
Status: CANCELLED | OUTPATIENT
Start: 2020-03-16

## 2020-03-16 RX ORDER — LOSARTAN POTASSIUM 100 MG/1
100 TABLET ORAL DAILY
Qty: 90 TABLET | Refills: 4 | Status: CANCELLED | OUTPATIENT
Start: 2020-03-16

## 2020-03-25 ENCOUNTER — PATIENT MESSAGE (OUTPATIENT)
Dept: INTERNAL MEDICINE | Facility: CLINIC | Age: 40
End: 2020-03-25

## 2020-03-25 RX ORDER — ALBUTEROL SULFATE 90 UG/1
2 AEROSOL, METERED RESPIRATORY (INHALATION) EVERY 8 HOURS PRN
Qty: 18 G | Refills: 5 | Status: SHIPPED | OUTPATIENT
Start: 2020-03-25 | End: 2020-10-07 | Stop reason: SDUPTHER

## 2020-03-25 NOTE — TELEPHONE ENCOUNTER
"REFILL APPROVED      Requested Prescriptions     Signed Prescriptions Disp Refills    albuterol (VENTOLIN HFA) 90 mcg/actuation inhaler 18 g 5     Sig: Inhale 2 puffs into the lungs every 8 (eight) hours as needed for Wheezing. (RESCUE INHALER)     Authorizing Provider: CRISTIAN MIKE Damian.    I approved your request for refill of albuterol.  Medications Ordered This Encounter   Medications    albuterol (VENTOLIN HFA) 90 mcg/actuation inhaler     Sig: Inhale 2 puffs into the lungs every 8 (eight) hours as needed for Wheezing. (RESCUE INHALER)     Dispense:  18 g     Refill:  5     Stay home, take care, and stay well.     Sincerely,     CRISTIAN Mike MD  ----------------------------------------  FOLLOW THE CDC'S RECOMMENDATIONS ON HOW TO PROTECT YOURSELF AND YOUR FAMILY:   - https://www.cdc.gov/coronavirus/2019-ncov/hcp/guidance-prevent-spread.html#precautions    WHERE TO GET INFORMATION ABOUT COVID-19:   - Dial 211 or Text keyword LACOVID to 390-326 for general questions and information   - [email or web address]    IF YOU OR A FAMILY MEMBER HAS SYMPTOMS:   - Use the [facility] "Coronavirus Self-," available at the following link:        https://www.cdc.gov/coronavirus/2019-ncov/symptoms-testing/index.html#   - Do a video visit using [facility] ([email or web address])   - Call the [facility] COVID-19 Line ([phone]) for guidance.     "

## 2020-04-22 ENCOUNTER — OFFICE VISIT (OUTPATIENT)
Dept: INTERNAL MEDICINE | Facility: CLINIC | Age: 40
End: 2020-04-22
Payer: COMMERCIAL

## 2020-04-22 DIAGNOSIS — J30.89 ENVIRONMENTAL AND SEASONAL ALLERGIES: ICD-10-CM

## 2020-04-22 DIAGNOSIS — I10 ESSENTIAL HYPERTENSION: Chronic | ICD-10-CM

## 2020-04-22 DIAGNOSIS — J45.30 MILD PERSISTENT ASTHMA WITHOUT COMPLICATION: Primary | ICD-10-CM

## 2020-04-22 PROCEDURE — 99214 PR OFFICE/OUTPT VISIT, EST, LEVL IV, 30-39 MIN: ICD-10-PCS | Mod: 95,,, | Performed by: FAMILY MEDICINE

## 2020-04-22 PROCEDURE — 99214 OFFICE O/P EST MOD 30 MIN: CPT | Mod: 95,,, | Performed by: FAMILY MEDICINE

## 2020-04-22 RX ORDER — FLUTICASONE PROPIONATE 50 MCG
2 SPRAY, SUSPENSION (ML) NASAL DAILY
Qty: 16 G | Refills: 3 | Status: SHIPPED | OUTPATIENT
Start: 2020-04-22 | End: 2020-11-12 | Stop reason: SDUPTHER

## 2020-04-22 NOTE — PROGRESS NOTES
The patient location is: Home  The chief complaint leading to consultation is:  Shortness of breath  Visit type: audiovisual  Total time spent with patient: 10 mins  Each patient to whom he or she provides medical services by telemedicine is:  (1) informed of the relationship between the physician and patient and the respective role of any other health care provider with respect to management of the patient; and (2) notified that he or she may decline to receive medical services by telemedicine and may withdraw from such care at any time.    Notes:   Subjective:       Patient ID: Andrew Allen is a 39 y.o. male.    Chief Complaint: No chief complaint on file.    39-year-old  male patient with Patient Active Problem List:     Chronic viral hepatitis B     Essential hypertension     Mild intermittent asthma without complication  Here reports that he has been using albuterol inhaler 2-3 3 times daily as needed more frequently lately, patient was previously on maintenance inhaler but has been discontinued as he did not need it.  Reports that he has been having dry cough and difficulty with breathing with wheezing but denies any fever, has been having runny nose and congestion and has been out of Flonase.  Patient denies any decreased appetite or loss of taste or smell.  Has been taking his blood pressure medications regularly and has been stable.  Denies any chest pain or palpitations.     Review of Systems   Constitutional: Negative for fever.   Eyes: Negative for visual disturbance.   Respiratory: Positive for cough and wheezing. Negative for shortness of breath.    Cardiovascular: Negative for chest pain and palpitations.   Gastrointestinal: Negative for abdominal pain, nausea and vomiting.   Musculoskeletal: Negative for myalgias.   Skin: Negative for rash.   Neurological: Negative for headaches.   Psychiatric/Behavioral: Negative for sleep disturbance.         There were no vitals taken for  this visit.  Objective:      Physical Exam   Constitutional: He is oriented to person, place, and time. He appears well-developed and well-nourished.   Pulmonary/Chest: No respiratory distress.   Neurological: He is alert and oriented to person, place, and time.   Psychiatric: He has a normal mood and affect.         Assessment/Plan:   1. Mild persistent asthma without complication  Arnuity inhaler will be prescribed today for maintenance and advised to continue using albuterol inhaler as needed for rescue wheezing.   Encouraged to drink adequate fluids    If any worsening symptoms patient was advised to consider getting COVID testing.     2. Environmental and seasonal allergies  - fluticasone propionate (FLONASE) 50 mcg/actuation nasal spray; 2 sprays (100 mcg total) by Each Nostril route once daily.  Dispense: 16 g; Refill: 3  Flonase prescribed today for symptomatic relief and advised to take over-the-counter Zyrtec as needed    3. Essential hypertension  Stable on losartan 100 mg daily

## 2020-07-04 DIAGNOSIS — I10 ESSENTIAL HYPERTENSION: Chronic | ICD-10-CM

## 2020-07-06 RX ORDER — LOSARTAN POTASSIUM 100 MG/1
TABLET ORAL
Qty: 90 TABLET | Refills: 1 | Status: SHIPPED | OUTPATIENT
Start: 2020-07-06 | End: 2020-11-12 | Stop reason: SDUPTHER

## 2020-07-06 NOTE — TELEPHONE ENCOUNTER
ACTION NEEDED:  Please read Patient Portal Message (below). Then contact him to verify his receipt and understanding of the message and to help him schedule appointment(s) referenced below. Thanks.  --------------------------------------------------------------------------------  --------------------------------------------------------------------------------   Andrew Starkey.    I approved a refill of your medicine listed below.    Medications Ordered This Encounter   Medications    losartan (COZAAR) 100 MG tablet      IMPORTANT: Before I can give you any additional refills, I'm going to need to re-evaluate you with an appointment. If you enroll in [facility] program so I can get your blood pressure readings from home, that appointment can be a telemedicine virtual visit to lower your risk for exposure to COVID-19. Otherwise, your appointment will need to be in-office so we can measure your blood pressure. (I'm including some information about [facility] program below.)    Please take the time right now to schedule your appointment in mid-late October You can schedule your appointment from your [facility] account or from the [employer] kavitha on your smartphone or by calling our appointment desk at [phone]. If you have any difficulty, send my staff a message, and they will be glad to help.     I look forward to seeing you then.    Thanks for letting me care for you, thanks for trusting us with your healthcare needs, and thanks for using [facility].    Sincerely,    CRISTIAN Mike MD       Enroll in [facility]YPERTENSION [facility] program and you can use an electronic blood pressure monitor to measure your blood pressure at home. Those numbers are automatically sent by your smartphone to your hypertension care team for review. You will also receive in-depth education on high blood pressure and the lifestyle changes you can make to improve your blood  pressure.    Enrolling is easy!  Call the [email or web address] Help Desk at [phone] to get started  or stop by the [facility] in the 1st floor lobby of [facility].

## 2020-07-06 NOTE — TELEPHONE ENCOUNTER
Called and left a voicemail informing patient about Dr. Mike's My Chart message for him and the need to schedule a follow up appointment in October.

## 2020-10-07 DIAGNOSIS — J45.20 MILD INTERMITTENT ASTHMA WITHOUT COMPLICATION: Chronic | ICD-10-CM

## 2020-10-12 RX ORDER — ALBUTEROL SULFATE 90 UG/1
2 AEROSOL, METERED RESPIRATORY (INHALATION) EVERY 8 HOURS PRN
Qty: 18 G | Refills: 2 | Status: SHIPPED | OUTPATIENT
Start: 2020-10-12 | End: 2020-11-12 | Stop reason: SDUPTHER

## 2020-10-12 NOTE — TELEPHONE ENCOUNTER
Andrew Starkey.    I hope you are doing well.    I approved a refill of your albuterol    I reviewed your chart, and I noticed that it has been almost a year since I last saw you. I need to examine you at least once a year to be able to continue your treatment. Please schedule an appointment with me for your annual wellness exam within 2-3 months and before you will need any additional refills so your treatment will not be interrupted.    You can schedule your appointment from your [facility] account or from the [employer] kavitha on your smartphone or by calling our appointment desk at [phone]. If you have any difficulty, send my staff a message, and they will be glad to help.    At your appointment, I will examine your heart, lungs, arteries, and abdomen and take care of any preventive health measures you need.    Most health insurances allow for one free wellness exam per year, and some employers offer premium discounts for employees who get their annual wellness exam. If possible, we will let this appointment to be your yearly wellness exam.    Thanks for letting me care for you, and thanks for trusting [facility] with your healthcare needs.    I look forward to seeing you at your next appointment.     Sincerely,    CRISTIAN Mike MD     Medications Ordered This Encounter   Medications    albuterol (VENTOLIN HFA) 90 mcg/actuation inhaler     Sig: Inhale 2 puffs into the lungs every 8 (eight) hours as needed for Wheezing. (RESCUE INHALER)     Dispense:  18 g     Refill:  2     - APPOINTMENT REQUIRED FOR MORE REFILLS    #LMRX

## 2020-10-26 NOTE — TELEPHONE ENCOUNTER
Called and left a voicemail for patient to call and schedule his annual appointment with Dr. Mike.

## 2020-10-30 ENCOUNTER — TELEPHONE (OUTPATIENT)
Dept: INTERNAL MEDICINE | Facility: CLINIC | Age: 40
End: 2020-10-30

## 2020-10-30 NOTE — TELEPHONE ENCOUNTER
----- Message from Sravanthi Skaggs sent at 10/30/2020 10:47 AM CDT -----  Regarding: Returning a missed call  Type:  Patient Returning Call    Who Called: PT   Who Left Message for Patient: ron  Does the patient know what this is regarding?: yes   Would the patient rather a call back or a response via [facility]? Call back   Best Call Back Number: [phone] (home)   Additional Information: n/a

## 2020-11-05 ENCOUNTER — PATIENT OUTREACH (OUTPATIENT)
Dept: ADMINISTRATIVE | Facility: HOSPITAL | Age: 40
End: 2020-11-05

## 2020-11-05 ENCOUNTER — PATIENT MESSAGE (OUTPATIENT)
Dept: INTERNAL MEDICINE | Facility: CLINIC | Age: 40
End: 2020-11-05

## 2020-11-05 NOTE — PROGRESS NOTES
Working HTN Report       Called pt for home BP Reading, No answer, L/M       Beryl FOSTER LPN Care Coordinator  Care Coordination Department  [facility]  [phone]

## 2020-11-07 ENCOUNTER — PATIENT OUTREACH (OUTPATIENT)
Dept: ADMINISTRATIVE | Facility: HOSPITAL | Age: 40
End: 2020-11-07

## 2020-11-07 NOTE — PROGRESS NOTES
Working HTN Report     2nd Attempt   Called pt for home BP Reading, No answer, L/M, Portal Message Sent       Beryl FOSTER LPN Care Coordinator  Care Coordination Department  [facility]  [phone]

## 2020-11-12 ENCOUNTER — OFFICE VISIT (OUTPATIENT)
Dept: INTERNAL MEDICINE | Facility: CLINIC | Age: 40
End: 2020-11-12
Payer: COMMERCIAL

## 2020-11-12 ENCOUNTER — LAB VISIT (OUTPATIENT)
Dept: LAB | Facility: HOSPITAL | Age: 40
End: 2020-11-12
Attending: FAMILY MEDICINE
Payer: COMMERCIAL

## 2020-11-12 VITALS
SYSTOLIC BLOOD PRESSURE: 120 MMHG | WEIGHT: 243.38 LBS | TEMPERATURE: 96 F | DIASTOLIC BLOOD PRESSURE: 80 MMHG | BODY MASS INDEX: 34.07 KG/M2 | OXYGEN SATURATION: 97 % | HEIGHT: 71 IN | HEART RATE: 88 BPM

## 2020-11-12 DIAGNOSIS — E78.2 MODERATE MIXED HYPERLIPIDEMIA NOT REQUIRING STATIN THERAPY: ICD-10-CM

## 2020-11-12 DIAGNOSIS — J45.20 MILD INTERMITTENT ASTHMA WITHOUT COMPLICATION: Chronic | ICD-10-CM

## 2020-11-12 DIAGNOSIS — Z11.59 ENCOUNTER FOR HEPATITIS C SCREENING TEST FOR LOW RISK PATIENT: ICD-10-CM

## 2020-11-12 DIAGNOSIS — Z00.00 PREVENTATIVE HEALTH CARE: Primary | ICD-10-CM

## 2020-11-12 DIAGNOSIS — J30.89 ENVIRONMENTAL AND SEASONAL ALLERGIES: ICD-10-CM

## 2020-11-12 DIAGNOSIS — I10 ESSENTIAL HYPERTENSION: Chronic | ICD-10-CM

## 2020-11-12 DIAGNOSIS — Z00.00 PREVENTATIVE HEALTH CARE: ICD-10-CM

## 2020-11-12 DIAGNOSIS — Z11.4 SCREENING FOR HIV WITHOUT PRESENCE OF RISK FACTORS: ICD-10-CM

## 2020-11-12 DIAGNOSIS — Z23 NEED FOR INFLUENZA VACCINATION: ICD-10-CM

## 2020-11-12 PROCEDURE — 3074F PR MOST RECENT SYSTOLIC BLOOD PRESSURE < 130 MM HG: ICD-10-PCS | Mod: CPTII,S$GLB,, | Performed by: FAMILY MEDICINE

## 2020-11-12 PROCEDURE — 1126F PR PAIN SEVERITY QUANTIFIED, NO PAIN PRESENT: ICD-10-PCS | Mod: S$GLB,,, | Performed by: FAMILY MEDICINE

## 2020-11-12 PROCEDURE — 86803 HEPATITIS C AB TEST: CPT

## 2020-11-12 PROCEDURE — 90471 FLU VACCINE (QUAD) GREATER THAN OR EQUAL TO 3YO PRESERVATIVE FREE IM: ICD-10-PCS | Mod: S$GLB,,, | Performed by: FAMILY MEDICINE

## 2020-11-12 PROCEDURE — 90686 IIV4 VACC NO PRSV 0.5 ML IM: CPT | Mod: S$GLB,,, | Performed by: FAMILY MEDICINE

## 2020-11-12 PROCEDURE — 90686 FLU VACCINE (QUAD) GREATER THAN OR EQUAL TO 3YO PRESERVATIVE FREE IM: ICD-10-PCS | Mod: S$GLB,,, | Performed by: FAMILY MEDICINE

## 2020-11-12 PROCEDURE — 80061 LIPID PANEL: CPT

## 2020-11-12 PROCEDURE — 86703 HIV-1/HIV-2 1 RESULT ANTBDY: CPT

## 2020-11-12 PROCEDURE — 3079F PR MOST RECENT DIASTOLIC BLOOD PRESSURE 80-89 MM HG: ICD-10-PCS | Mod: CPTII,S$GLB,, | Performed by: FAMILY MEDICINE

## 2020-11-12 PROCEDURE — 99396 PR PREVENTIVE VISIT,EST,40-64: ICD-10-PCS | Mod: 25,S$GLB,, | Performed by: FAMILY MEDICINE

## 2020-11-12 PROCEDURE — 3008F BODY MASS INDEX DOCD: CPT | Mod: CPTII,S$GLB,, | Performed by: FAMILY MEDICINE

## 2020-11-12 PROCEDURE — 3008F PR BODY MASS INDEX (BMI) DOCUMENTED: ICD-10-PCS | Mod: CPTII,S$GLB,, | Performed by: FAMILY MEDICINE

## 2020-11-12 PROCEDURE — 99999 PR PBB SHADOW E&M-EST. PATIENT-LVL III: CPT | Mod: PBBFAC,,, | Performed by: FAMILY MEDICINE

## 2020-11-12 PROCEDURE — 3079F DIAST BP 80-89 MM HG: CPT | Mod: CPTII,S$GLB,, | Performed by: FAMILY MEDICINE

## 2020-11-12 PROCEDURE — 99999 PR PBB SHADOW E&M-EST. PATIENT-LVL III: ICD-10-PCS | Mod: PBBFAC,,, | Performed by: FAMILY MEDICINE

## 2020-11-12 PROCEDURE — 90471 IMMUNIZATION ADMIN: CPT | Mod: S$GLB,,, | Performed by: FAMILY MEDICINE

## 2020-11-12 PROCEDURE — 99396 PREV VISIT EST AGE 40-64: CPT | Mod: 25,S$GLB,, | Performed by: FAMILY MEDICINE

## 2020-11-12 PROCEDURE — 80053 COMPREHEN METABOLIC PANEL: CPT

## 2020-11-12 PROCEDURE — 3074F SYST BP LT 130 MM HG: CPT | Mod: CPTII,S$GLB,, | Performed by: FAMILY MEDICINE

## 2020-11-12 PROCEDURE — 36415 COLL VENOUS BLD VENIPUNCTURE: CPT

## 2020-11-12 PROCEDURE — 1126F AMNT PAIN NOTED NONE PRSNT: CPT | Mod: S$GLB,,, | Performed by: FAMILY MEDICINE

## 2020-11-12 RX ORDER — LOSARTAN POTASSIUM 50 MG/1
50 TABLET ORAL DAILY
Qty: 90 TABLET | Refills: 4 | Status: SHIPPED | OUTPATIENT
Start: 2020-11-12 | End: 2022-03-21 | Stop reason: SDUPTHER

## 2020-11-12 RX ORDER — FLUTICASONE PROPIONATE 50 MCG
2 SPRAY, SUSPENSION (ML) NASAL DAILY
Qty: 16 G | Refills: 3 | Status: SHIPPED | OUTPATIENT
Start: 2020-11-12 | End: 2021-04-19

## 2020-11-12 RX ORDER — FLUTICASONE PROPIONATE AND SALMETEROL 250; 50 UG/1; UG/1
1 POWDER RESPIRATORY (INHALATION) 2 TIMES DAILY
Qty: 60 EACH | Refills: 11 | Status: SHIPPED | OUTPATIENT
Start: 2020-11-12 | End: 2021-03-12 | Stop reason: ALTCHOICE

## 2020-11-12 RX ORDER — ALBUTEROL SULFATE 90 UG/1
2 AEROSOL, METERED RESPIRATORY (INHALATION) EVERY 8 HOURS PRN
Qty: 18 G | Refills: 5 | Status: SHIPPED | OUTPATIENT
Start: 2020-11-12 | End: 2021-03-23 | Stop reason: SDUPTHER

## 2020-11-12 NOTE — PROGRESS NOTES
WELLNESS VISIT (PREVENTIVE SERVICES)    CHIEF COMPLAINT  Annual Wellness Exam    HEALTH MAINTENANCE INTERVENTIONS - UP TO DATE  Health Maintenance Topics with due status: Not Due       Topic Last Completion Date    TETANUS VACCINE 10/19/2019    Lipid Panel 11/12/2020       HEALTH MAINTENANCE INTERVENTIONS - DUE OR DUE SOON  There are no preventive care reminders to display for this patient.    SUMMARY OF DIAGNOSES AND ORDERS  Assessment   Preventative health care  -     Lipid Panel; Future; Expected date: 11/12/2020  -     Comprehensive Metabolic Panel; Future; Expected date: 11/12/2020  -     Hepatitis C Antibody; Future; Expected date: 11/12/2020  -     HIV 1/2 Ag/Ab (4th Gen); Future; Expected date: 11/12/2020    Moderate mixed hyperlipidemia not requiring statin therapy  -     Lipid Panel; Future; Expected date: 11/12/2020  -     Comprehensive Metabolic Panel; Future; Expected date: 11/12/2020    Mild intermittent asthma without complication  -     fluticasone-salmeterol diskus inhaler 250-50 mcg; Inhale 1 puff into the lungs 2 (two) times daily. Controller  Dispense: 60 each; Refill: 11  -     albuterol (VENTOLIN HFA) 90 mcg/actuation inhaler; Inhale 2 puffs into the lungs every 8 (eight) hours as needed for Wheezing. (RESCUE INHALER)  Dispense: 18 g; Refill: 5    Environmental and seasonal allergies  -     fluticasone propionate (FLONASE) 50 mcg/actuation nasal spray; 2 sprays (100 mcg total) by Each Nostril route once daily.  Dispense: 16 g; Refill: 3    Essential hypertension  -     losartan (COZAAR) 50 MG tablet; Take 1 tablet (50 mg total) by mouth once daily.  Dispense: 90 tablet; Refill: 4    Encounter for hepatitis C screening test for low risk patient  -     Hepatitis C Antibody; Future; Expected date: 11/12/2020    Screening for HIV without presence of risk factors  -     HIV 1/2 Ag/Ab (4th Gen); Future; Expected date: 11/12/2020    Need for influenza vaccination    Other orders  -     Influenza -  Quadrivalent (PF)         Except as noted herein, any chronic conditions are compensated/controlled and stable, and no other significant new complaints or concerns reported.     Plan   Problem List Items Addressed This Visit        Pulmonary    Mild intermittent asthma without complication (Chronic)    Relevant Medications    fluticasone-salmeterol diskus inhaler 250-50 mcg    albuterol (VENTOLIN HFA) 90 mcg/actuation inhaler       Cardiac/Vascular    Essential hypertension (Chronic)    Relevant Medications    losartan (COZAAR) 50 MG tablet       Other    Moderate mixed hyperlipidemia not requiring statin therapy    Relevant Orders    Lipid Panel (Completed)    Comprehensive Metabolic Panel (Completed)    Environmental and seasonal allergies    Relevant Medications    fluticasone propionate (FLONASE) 50 mcg/actuation nasal spray      Other Visit Diagnoses     Preventative health care    -  Primary    Relevant Orders    Lipid Panel (Completed)    Comprehensive Metabolic Panel (Completed)    Hepatitis C Antibody (Completed)    HIV 1/2 Ag/Ab (4th Gen) (Completed)    Encounter for hepatitis C screening test for low risk patient        Relevant Orders    Hepatitis C Antibody (Completed)    Screening for HIV without presence of risk factors        Relevant Orders    HIV 1/2 Ag/Ab (4th Gen) (Completed)    Need for influenza vaccination              Outpatient Medications Prior to Visit   Medication Sig Dispense Refill    multivitamin (THERAGRAN) per tablet Take 1 tablet by mouth once daily.      tenofovir alafenamide (VEMLIDY) 25 mg Tab Vemlidy 25 mg tablet   Take 1 tablet every day by oral route for 30 days.      albuterol (VENTOLIN HFA) 90 mcg/actuation inhaler Inhale 2 puffs into the lungs every 8 (eight) hours as needed for Wheezing. (RESCUE INHALER) 18 g 2    fluticasone furoate (ARNUITY ELLIPTA) 100 mcg/actuation DsDv Inhale 100 mcg into the lungs once daily. Controller 30 each 3    fluticasone propionate  (FLONASE) 50 mcg/actuation nasal spray 2 sprays (100 mcg total) by Each Nostril route once daily. 16 g 3    losartan (COZAAR) 100 MG tablet TAKE 1 TABLET BY MOUTH EVERY DAY 90 tablet 1     No facility-administered medications prior to visit.         Medications Discontinued During This Encounter   Medication Reason    fluticasone furoate (ARNUITY ELLIPTA) 100 mcg/actuation DsDv Alternate therapy    albuterol (VENTOLIN HFA) 90 mcg/actuation inhaler Reorder    fluticasone propionate (FLONASE) 50 mcg/actuation nasal spray Reorder    losartan (COZAAR) 100 MG tablet Reorder        Medications Ordered This Encounter   Medications    albuterol (VENTOLIN HFA) 90 mcg/actuation inhaler     Sig: Inhale 2 puffs into the lungs every 8 (eight) hours as needed for Wheezing. (RESCUE INHALER)     Dispense:  18 g     Refill:  5    fluticasone propionate (FLONASE) 50 mcg/actuation nasal spray     Si sprays (100 mcg total) by Each Nostril route once daily.     Dispense:  16 g     Refill:  3    fluticasone-salmeterol diskus inhaler 250-50 mcg     Sig: Inhale 1 puff into the lungs 2 (two) times daily. Controller     Dispense:  60 each     Refill:  11     IMPORTANT!  This REPLACES Arnuity Ellipta. DISCONTINUE any existing prescription for Arnuity Ellipta.    losartan (COZAAR) 50 MG tablet     Sig: Take 1 tablet (50 mg total) by mouth once daily.     Dispense:  90 tablet     Refill:  4     -       Follow up in about 4 months (around 3/12/2021) for virtual visit to re-evaluate asthma.     Subjective   Review of Systems   Constitutional: Negative for chills and fever.   HENT: Negative for ear pain and trouble swallowing.    Eyes: Negative for pain and visual disturbance.   Respiratory: Negative for chest tightness and shortness of breath.    Cardiovascular: Negative for chest pain and palpitations.   Gastrointestinal: Negative for abdominal pain and blood in stool.   Endocrine: Negative for polydipsia and polyuria.  "  Genitourinary: Negative.  Negative for difficulty urinating, dysuria and hematuria.   Musculoskeletal: Negative for gait problem and joint swelling.   Integumentary:  Negative for rash and wound.   Neurological: Negative for vertigo and syncope.   Hematological: Negative.    Psychiatric/Behavioral: Negative for agitation and confusion.        Objective   Vitals:    11/12/20 1616   BP: 120/80   BP Location: Left arm   Patient Position: Sitting   BP Method: Medium (Manual)   Pulse: 88   Temp: 96.3 °F (35.7 °C)   TempSrc: Tympanic   SpO2: 97%   Weight: 110.4 kg (243 lb 6.2 oz)   Height: 5' 11" (1.803 m)     Physical Exam  Vitals signs reviewed.   Constitutional:       General: He is not in acute distress.     Appearance: Normal appearance.   Eyes:      General: No scleral icterus.     Conjunctiva/sclera: Conjunctivae normal.   Neck:      Musculoskeletal: No muscular tenderness.      Vascular: No carotid bruit.   Cardiovascular:      Rate and Rhythm: Normal rate and regular rhythm.      Heart sounds: Normal heart sounds.   Pulmonary:      Effort: Pulmonary effort is normal. No respiratory distress.      Breath sounds: Normal breath sounds. No wheezing or rhonchi.   Abdominal:      General: Bowel sounds are normal. There is no distension.      Palpations: Abdomen is soft. There is no mass.      Tenderness: There is no abdominal tenderness.   Lymphadenopathy:      Cervical: No cervical adenopathy.   Skin:     General: Skin is warm and dry.      Coloration: Skin is not jaundiced.   Neurological:      General: No focal deficit present.      Mental Status: He is alert and oriented to person, place, and time.   Psychiatric:         Mood and Affect: Mood normal.         Behavior: Behavior normal.         Judgment: Judgment normal.         No images are attached to the encounter.    PAST MEDICAL HISTORY  He has a past medical history of Asthma, Chronic viral hepatitis B, Essential hypertension, Hepatitis B, Hyperlipidemia, " "Hypertension, Mild intermittent asthma without complication, and Mild persistent asthma without complication.    SURGICAL HISTORY  He has a past surgical history that includes Knee arthroscopy (Right, 1995) and Liver biopsy (2017).    FAMILY HISTORY  His family history includes Diabetes in his father and maternal grandmother; Heart failure in his maternal grandmother; Hypertension in his father and mother.     ALLERGIES  Review of patient's allergies indicates:  No Known Allergies    SOCIAL HISTORY   TOBACCO USE HISTORY: He reports that he has never smoked. He has never used smokeless tobacco.   ALCOHOL USE HISTORY:  He reports no history of alcohol use.   RECREATIONAL DRUG USE HISTORY:  He has no history on file for drug.    Documentation entered by me for this encounter may have been done in part using speech-recognition technology. Although I have made an effort to ensure accuracy, "sound like" errors may exist and should be interpreted in context. -CRISTIAN Mike MD.    " Kalee Chakraborty Natalie Ianciotti

## 2020-11-12 NOTE — PATIENT INSTRUCTIONS
We want you to have a good virtual visit experience, so please:  1. Prepare for your virtual visit by following the instructions found at [email or web address].  a. Watch the video (if you haven't seen it before), and then click on the [employer] VIRTUAL VISIT button for instructions how to prepare for your visit.  2. Check-in for your appointment 10-15 minutes early to allow time to troubleshoot any technical problems.  3. Call the [facility] Patient Support Team at [phone] if you need help getting ready for your virtual visit or checking in for a virtual visit.    We encourage you to use headphones or earbuds during the visit to make it easier to hear and be heard.    Remember that the law requires that you be in the state [facility] at the time of your virtual visit, and you need to be in a safe environment. For example, you can't be driving during your virtual visit. If you are driving when the doctor joins you in the virtual visit, your appointment may have to be postponed or rescheduled.    Thanks for letting us care for you, and thanks for trusting [facility] with your healthcare needs.

## 2020-11-13 LAB
ALBUMIN SERPL BCP-MCNC: 4.1 G/DL (ref 3.5–5.2)
ALP SERPL-CCNC: 55 U/L (ref 55–135)
ALT SERPL W/O P-5'-P-CCNC: 17 U/L (ref 10–44)
ANION GAP SERPL CALC-SCNC: 8 MMOL/L (ref 8–16)
AST SERPL-CCNC: 21 U/L (ref 10–40)
BILIRUB SERPL-MCNC: 0.8 MG/DL (ref 0.1–1)
BUN SERPL-MCNC: 18 MG/DL (ref 6–20)
CALCIUM SERPL-MCNC: 9.6 MG/DL (ref 8.7–10.5)
CHLORIDE SERPL-SCNC: 104 MMOL/L (ref 95–110)
CHOLEST SERPL-MCNC: 232 MG/DL (ref 120–199)
CHOLEST/HDLC SERPL: 3.3 {RATIO} (ref 2–5)
CO2 SERPL-SCNC: 29 MMOL/L (ref 23–29)
CREAT SERPL-MCNC: 1 MG/DL (ref 0.5–1.4)
EST. GFR  (AFRICAN AMERICAN): >60 ML/MIN/1.73 M^2
EST. GFR  (NON AFRICAN AMERICAN): >60 ML/MIN/1.73 M^2
GLUCOSE SERPL-MCNC: 88 MG/DL (ref 70–110)
HDLC SERPL-MCNC: 70 MG/DL (ref 40–75)
HDLC SERPL: 30.2 % (ref 20–50)
LDLC SERPL CALC-MCNC: 151.6 MG/DL (ref 63–159)
NONHDLC SERPL-MCNC: 162 MG/DL
POTASSIUM SERPL-SCNC: 4.3 MMOL/L (ref 3.5–5.1)
PROT SERPL-MCNC: 7.9 G/DL (ref 6–8.4)
SODIUM SERPL-SCNC: 141 MMOL/L (ref 136–145)
TRIGL SERPL-MCNC: 52 MG/DL (ref 30–150)

## 2020-11-16 LAB
HCV AB SERPL QL IA: NEGATIVE
HIV 1+2 AB+HIV1 P24 AG SERPL QL IA: NEGATIVE

## 2020-11-19 PROBLEM — E78.2 MODERATE MIXED HYPERLIPIDEMIA NOT REQUIRING STATIN THERAPY: Status: ACTIVE | Noted: 2020-11-19

## 2020-11-19 PROBLEM — J30.89 ENVIRONMENTAL AND SEASONAL ALLERGIES: Status: ACTIVE | Noted: 2020-11-19

## 2020-11-19 NOTE — PROGRESS NOTES
"Hi, Andrew.    These results are OK and do not require a change in treatment right now. We can discuss your test results in detail at your next appointment.    Thanks for letting me care for you, thanks for trusting us with your healthcare needs, and thanks for using [facility].    Sincerely,    CRISTIAN Mike MD  --------------------------------------------------------------------------------   Want to learn more about your test results and what they mean?  (1) Log in to your [facility] account at [email or web address].  (2) From the "View test results" tab, click on the test you want to know more about.  (3) Click on the "About This Test" link."

## 2021-03-10 ENCOUNTER — PATIENT MESSAGE (OUTPATIENT)
Dept: INTERNAL MEDICINE | Facility: CLINIC | Age: 41
End: 2021-03-10

## 2021-03-12 ENCOUNTER — OFFICE VISIT (OUTPATIENT)
Dept: INTERNAL MEDICINE | Facility: CLINIC | Age: 41
End: 2021-03-12
Payer: COMMERCIAL

## 2021-03-12 DIAGNOSIS — J30.89 ENVIRONMENTAL AND SEASONAL ALLERGIES: ICD-10-CM

## 2021-03-12 DIAGNOSIS — I10 ESSENTIAL HYPERTENSION: Chronic | ICD-10-CM

## 2021-03-12 DIAGNOSIS — J45.20 MILD INTERMITTENT ASTHMA WITHOUT COMPLICATION: Primary | ICD-10-CM

## 2021-03-12 PROCEDURE — 99214 OFFICE O/P EST MOD 30 MIN: CPT | Mod: 95,,, | Performed by: FAMILY MEDICINE

## 2021-03-12 PROCEDURE — 99214 PR OFFICE/OUTPT VISIT, EST, LEVL IV, 30-39 MIN: ICD-10-PCS | Mod: 95,,, | Performed by: FAMILY MEDICINE

## 2021-03-12 RX ORDER — FLUTICASONE PROPIONATE 220 UG/1
1 AEROSOL, METERED RESPIRATORY (INHALATION) 2 TIMES DAILY
Qty: 12 G | Refills: 11 | Status: SHIPPED | OUTPATIENT
Start: 2021-03-12 | End: 2021-03-23 | Stop reason: SDUPTHER

## 2021-03-23 DIAGNOSIS — J45.20 MILD INTERMITTENT ASTHMA WITHOUT COMPLICATION: ICD-10-CM

## 2021-03-28 RX ORDER — FLUTICASONE PROPIONATE 220 UG/1
1 AEROSOL, METERED RESPIRATORY (INHALATION) 2 TIMES DAILY
Qty: 90 G | Refills: 3 | Status: SHIPPED | OUTPATIENT
Start: 2021-03-28 | End: 2022-03-21 | Stop reason: ALTCHOICE

## 2021-03-28 RX ORDER — ALBUTEROL SULFATE 90 UG/1
2 AEROSOL, METERED RESPIRATORY (INHALATION) EVERY 8 HOURS PRN
Qty: 18 G | Refills: 5 | Status: SHIPPED | OUTPATIENT
Start: 2021-03-28 | End: 2021-06-26 | Stop reason: SDUPTHER

## 2021-03-30 ENCOUNTER — TELEPHONE (OUTPATIENT)
Dept: INTERNAL MEDICINE | Facility: CLINIC | Age: 41
End: 2021-03-30

## 2021-04-13 ENCOUNTER — PATIENT MESSAGE (OUTPATIENT)
Dept: INTERNAL MEDICINE | Facility: CLINIC | Age: 41
End: 2021-04-13

## 2021-04-19 DIAGNOSIS — J30.89 ENVIRONMENTAL AND SEASONAL ALLERGIES: ICD-10-CM

## 2021-04-19 RX ORDER — FLUTICASONE PROPIONATE 50 MCG
2 SPRAY, SUSPENSION (ML) NASAL DAILY
Qty: 15.8 ML | Refills: 3 | Status: SHIPPED | OUTPATIENT
Start: 2021-04-19 | End: 2021-04-21 | Stop reason: SDUPTHER

## 2021-04-21 RX ORDER — FLUTICASONE PROPIONATE 50 MCG
2 SPRAY, SUSPENSION (ML) NASAL DAILY
Qty: 48 G | Refills: 3 | Status: SHIPPED | OUTPATIENT
Start: 2021-04-21 | End: 2022-03-21 | Stop reason: SDUPTHER

## 2021-04-28 ENCOUNTER — PATIENT MESSAGE (OUTPATIENT)
Dept: RESEARCH | Facility: HOSPITAL | Age: 41
End: 2021-04-28

## 2021-06-26 DIAGNOSIS — J45.20 MILD INTERMITTENT ASTHMA WITHOUT COMPLICATION: ICD-10-CM

## 2021-06-29 RX ORDER — ALBUTEROL SULFATE 90 UG/1
2 AEROSOL, METERED RESPIRATORY (INHALATION) EVERY 8 HOURS PRN
Qty: 18 G | Refills: 5 | Status: SHIPPED | OUTPATIENT
Start: 2021-06-29 | End: 2022-03-21 | Stop reason: SDUPTHER

## 2021-10-04 ENCOUNTER — PATIENT OUTREACH (OUTPATIENT)
Dept: ADMINISTRATIVE | Facility: HOSPITAL | Age: 41
End: 2021-10-04

## 2021-10-27 ENCOUNTER — PATIENT OUTREACH (OUTPATIENT)
Dept: ADMINISTRATIVE | Facility: HOSPITAL | Age: 41
End: 2021-10-27
Payer: COMMERCIAL

## 2022-03-21 ENCOUNTER — PATIENT MESSAGE (OUTPATIENT)
Dept: INTERNAL MEDICINE | Facility: CLINIC | Age: 42
End: 2022-03-21

## 2022-03-21 ENCOUNTER — OFFICE VISIT (OUTPATIENT)
Dept: INTERNAL MEDICINE | Facility: CLINIC | Age: 42
End: 2022-03-21
Payer: COMMERCIAL

## 2022-03-21 VITALS
SYSTOLIC BLOOD PRESSURE: 110 MMHG | DIASTOLIC BLOOD PRESSURE: 80 MMHG | WEIGHT: 259.69 LBS | OXYGEN SATURATION: 98 % | HEIGHT: 71 IN | HEART RATE: 82 BPM | TEMPERATURE: 99 F | BODY MASS INDEX: 36.35 KG/M2

## 2022-03-21 DIAGNOSIS — Z00.00 PREVENTATIVE HEALTH CARE: Primary | ICD-10-CM

## 2022-03-21 DIAGNOSIS — I10 ESSENTIAL HYPERTENSION: Chronic | ICD-10-CM

## 2022-03-21 DIAGNOSIS — B18.1 CHRONIC VIRAL HEPATITIS B: Chronic | ICD-10-CM

## 2022-03-21 DIAGNOSIS — E78.2 MODERATE MIXED HYPERLIPIDEMIA NOT REQUIRING STATIN THERAPY: ICD-10-CM

## 2022-03-21 DIAGNOSIS — J45.30 MILD PERSISTENT ASTHMA WITHOUT COMPLICATION: Chronic | ICD-10-CM

## 2022-03-21 DIAGNOSIS — J30.89 ENVIRONMENTAL AND SEASONAL ALLERGIES: ICD-10-CM

## 2022-03-21 DIAGNOSIS — E66.01 CLASS 2 SEVERE OBESITY DUE TO EXCESS CALORIES WITH SERIOUS COMORBIDITY AND BODY MASS INDEX (BMI) OF 36.0 TO 36.9 IN ADULT: ICD-10-CM

## 2022-03-21 PROBLEM — E66.812 CLASS 2 SEVERE OBESITY DUE TO EXCESS CALORIES WITH SERIOUS COMORBIDITY AND BODY MASS INDEX (BMI) OF 36.0 TO 36.9 IN ADULT: Status: ACTIVE | Noted: 2022-03-21

## 2022-03-21 PROBLEM — E66.812 CLASS 2 SEVERE OBESITY DUE TO EXCESS CALORIES WITH SERIOUS COMORBIDITY AND BODY MASS INDEX (BMI) OF 36.0 TO 36.9 IN ADULT: Chronic | Status: ACTIVE | Noted: 2022-03-21

## 2022-03-21 PROCEDURE — 3074F SYST BP LT 130 MM HG: CPT | Mod: CPTII,S$GLB,, | Performed by: FAMILY MEDICINE

## 2022-03-21 PROCEDURE — 3079F PR MOST RECENT DIASTOLIC BLOOD PRESSURE 80-89 MM HG: ICD-10-PCS | Mod: CPTII,S$GLB,, | Performed by: FAMILY MEDICINE

## 2022-03-21 PROCEDURE — 3008F BODY MASS INDEX DOCD: CPT | Mod: CPTII,S$GLB,, | Performed by: FAMILY MEDICINE

## 2022-03-21 PROCEDURE — 1160F RVW MEDS BY RX/DR IN RCRD: CPT | Mod: CPTII,S$GLB,, | Performed by: FAMILY MEDICINE

## 2022-03-21 PROCEDURE — 3074F PR MOST RECENT SYSTOLIC BLOOD PRESSURE < 130 MM HG: ICD-10-PCS | Mod: CPTII,S$GLB,, | Performed by: FAMILY MEDICINE

## 2022-03-21 PROCEDURE — 99396 PREV VISIT EST AGE 40-64: CPT | Mod: S$GLB,,, | Performed by: FAMILY MEDICINE

## 2022-03-21 PROCEDURE — 1159F MED LIST DOCD IN RCRD: CPT | Mod: CPTII,S$GLB,, | Performed by: FAMILY MEDICINE

## 2022-03-21 PROCEDURE — 3079F DIAST BP 80-89 MM HG: CPT | Mod: CPTII,S$GLB,, | Performed by: FAMILY MEDICINE

## 2022-03-21 PROCEDURE — 99999 PR PBB SHADOW E&M-EST. PATIENT-LVL III: CPT | Mod: PBBFAC,,, | Performed by: FAMILY MEDICINE

## 2022-03-21 PROCEDURE — 1160F PR REVIEW ALL MEDS BY PRESCRIBER/CLIN PHARMACIST DOCUMENTED: ICD-10-PCS | Mod: CPTII,S$GLB,, | Performed by: FAMILY MEDICINE

## 2022-03-21 PROCEDURE — 99999 PR PBB SHADOW E&M-EST. PATIENT-LVL III: ICD-10-PCS | Mod: PBBFAC,,, | Performed by: FAMILY MEDICINE

## 2022-03-21 PROCEDURE — 1159F PR MEDICATION LIST DOCUMENTED IN MEDICAL RECORD: ICD-10-PCS | Mod: CPTII,S$GLB,, | Performed by: FAMILY MEDICINE

## 2022-03-21 PROCEDURE — 99396 PR PREVENTIVE VISIT,EST,40-64: ICD-10-PCS | Mod: S$GLB,,, | Performed by: FAMILY MEDICINE

## 2022-03-21 PROCEDURE — 3008F PR BODY MASS INDEX (BMI) DOCUMENTED: ICD-10-PCS | Mod: CPTII,S$GLB,, | Performed by: FAMILY MEDICINE

## 2022-03-21 RX ORDER — LOSARTAN POTASSIUM 50 MG/1
50 TABLET ORAL DAILY
Qty: 90 TABLET | Refills: 4 | Status: SHIPPED | OUTPATIENT
Start: 2022-03-21 | End: 2023-05-02 | Stop reason: SDUPTHER

## 2022-03-21 RX ORDER — FLUTICASONE PROPIONATE 50 MCG
2 SPRAY, SUSPENSION (ML) NASAL DAILY
Qty: 48 G | Refills: 3 | Status: SHIPPED | OUTPATIENT
Start: 2022-03-21 | End: 2023-05-02 | Stop reason: SDUPTHER

## 2022-03-21 RX ORDER — FLUTICASONE PROPIONATE AND SALMETEROL 250; 50 UG/1; UG/1
1 POWDER RESPIRATORY (INHALATION) 2 TIMES DAILY
Qty: 180 EACH | Refills: 4 | Status: SHIPPED | OUTPATIENT
Start: 2022-03-21 | End: 2022-10-21 | Stop reason: SDUPTHER

## 2022-03-21 RX ORDER — ALBUTEROL SULFATE 90 UG/1
2 AEROSOL, METERED RESPIRATORY (INHALATION) EVERY 8 HOURS PRN
Qty: 36 G | Refills: 5 | Status: SHIPPED | OUTPATIENT
Start: 2022-03-21 | End: 2022-04-20

## 2022-03-21 NOTE — ASSESSMENT & PLAN NOTE
He reports last viral load was undetectable.  Lab Results   Component Value Date    AST 21 11/12/2020    AST 16 10/19/2019    AST 32 07/06/2010    ALT 17 11/12/2020    ALT 16 10/19/2019    ALT 58 (H) 07/06/2010     Lab Results   Component Value Date    ALKPHOS 55 11/12/2020    ALKPHOS 62 10/19/2019    ALKPHOS 50 (L) 07/06/2010    BILITOT 0.8 11/12/2020    ALBUMIN 4.1 11/12/2020

## 2022-03-21 NOTE — ASSESSMENT & PLAN NOTE
"Wt Readings from Last 6 Encounters:   03/21/22 117.8 kg (259 lb 11.2 oz)   11/12/20 110.4 kg (243 lb 6.2 oz)   10/19/19 109.6 kg (241 lb 10 oz)   05/31/18 91.2 kg (201 lb)   04/11/18 91.5 kg (201 lb 11.5 oz)   01/17/12 89.1 kg (196 lb 7.6 oz)     Estimated body mass index is 36.22 kg/m² as calculated from the following:    Height as of this encounter: 5' 11" (1.803 m).    Weight as of this encounter: 117.8 kg (259 lb 11.2 oz).    "

## 2022-03-21 NOTE — ASSESSMENT & PLAN NOTE
BP Readings from Last 6 Encounters:   03/21/22 110/80   11/12/20 120/80   10/19/19 128/86   05/31/18 120/83   04/11/18 122/86   01/17/12 122/78      Last 5 Patient Entered Readings                Current 30 Day Average:   Recent Readings        SBP (mmHg)        DBP (mmHg)        Pulse                      Lab Results   Component Value Date    ESTGFRAFRICA >60.0 11/12/2020    EGFRNONAA >60.0 11/12/2020    CREATININE 1.0 11/12/2020    BUN 18 11/12/2020    K 4.3 11/12/2020     11/12/2020     11/12/2020

## 2022-03-21 NOTE — ASSESSMENT & PLAN NOTE
Needing rescue inhaler at least once a day on the Flovent for controller. He says he did much better on Advair.

## 2022-03-21 NOTE — PROGRESS NOTES
WELLNESS VISIT (PREVENTIVE SERVICES)  3/21/22  3:00 PM CDT  LAST ENCOUNTER WITH ME:  3/12/2021   HEALTH MAINTENANCE INTERVENTIONS - UP TO DATE  Health Maintenance Topics with due status: Not Due       Topic Last Completion Date    TETANUS VACCINE 10/19/2019    Pneumococcal Vaccines (Age 0-64) 10/19/2019    Lipid Panel 11/12/2020       HEALTH MAINTENANCE INTERVENTIONS - DUE OR DUE SOON  Health Maintenance Due   Topic Date Due    Influenza Vaccine (1) 09/01/2021       CHIEF COMPLAINT: Annual Wellness Visit (Preventive Services)    DIAGNOSES SPECIFICALLY EVALUATED AND TREATED THIS ENCOUNTER  1. Preventative health care  - SCHEDULED EKG 12-LEAD (to [facility]); Future  - Lipid Panel; Future  - Comprehensive Metabolic Panel; Future    2. Essential hypertension  - SCHEDULED EKG 12-LEAD (to [facility]); Future  - losartan (COZAAR) 50 MG tablet; Take 1 tablet (50 mg total) by mouth once daily.  Dispense: 90 tablet; Refill: 4  - Lipid Panel; Future  - Comprehensive Metabolic Panel; Future  - Ambulatory referral/consult to [facility]; Future    3. Mild persistent asthma without complication  - fluticasone-salmeterol diskus inhaler 250-50 mcg; Inhale 1 puff into the lungs 2 (two) times daily. Controller  Dispense: 180 each; Refill: 4  - albuterol (VENTOLIN HFA) 90 mcg/actuation inhaler; Inhale 2 puffs into the lungs every 8 (eight) hours as needed for Wheezing. (RESCUE INHALER)  Dispense: 36 g; Refill: 5    4. Environmental and seasonal allergies  - fluticasone propionate (FLONASE) 50 mcg/actuation nasal spray; 2 sprays (100 mcg total) by Each Nostril route once daily.  Dispense: 48 g; Refill: 3    5. Moderate mixed hyperlipidemia not requiring statin therapy  - Lipid Panel; Future  - Comprehensive Metabolic Panel; Future    6. Chronic viral hepatitis B    7. Class 2 obesity due to excess calories with serious comorbidity and body mass index (BMI) of 36.0 to 36.9 (severe obesity)  - Ambulatory referral/consult to [facility]  [facility]; Future     Follow up in about 1 year (around 3/21/2023).    Problem List Items Addressed This Visit     Chronic viral hepatitis B (Chronic)    Overview      GI: Jovanni Loera MD    [id number] [place] [id number]              Current Assessment & Plan     He reports last viral load was undetectable.  Lab Results   Component Value Date    AST 21 11/12/2020    AST 16 10/19/2019    AST 32 07/06/2010    ALT 17 11/12/2020    ALT 16 10/19/2019    ALT 58 (H) 07/06/2010     Lab Results   Component Value Date    ALKPHOS 55 11/12/2020    ALKPHOS 62 10/19/2019    ALKPHOS 50 (L) 07/06/2010    BILITOT 0.8 11/12/2020    ALBUMIN 4.1 11/12/2020              Essential hypertension (Chronic)    Current Assessment & Plan     BP Readings from Last 6 Encounters:   03/21/22 110/80   11/12/20 120/80   10/19/19 128/86   05/31/18 120/83   04/11/18 122/86   01/17/12 122/78      Last 5 Patient Entered Readings                Current 30 Day Average:   Recent Readings        SBP (mmHg)        DBP (mmHg)        Pulse                      Lab Results   Component Value Date    ESTGFRAFRICA >60.0 11/12/2020    EGFRNONAA >60.0 11/12/2020    CREATININE 1.0 11/12/2020    BUN 18 11/12/2020    K 4.3 11/12/2020     11/12/2020     11/12/2020              Relevant Medications    losartan (COZAAR) 50 MG tablet    Other Relevant Orders    SCHEDULED EKG 12-LEAD (to [facility])    Lipid Panel    Comprehensive Metabolic Panel    Ambulatory referral/consult to [facility]    Mild persistent asthma without complication (Chronic)    Current Assessment & Plan     Needing rescue inhaler at least once a day on the Flovent for controller. He says he did much better on Advair.           Relevant Medications    fluticasone-salmeterol diskus inhaler 250-50 mcg    albuterol (VENTOLIN HFA) 90 mcg/actuation inhaler    Class 2 obesity due to excess calories with serious comorbidity and body mass index (BMI)  "of 36.0 to 36.9 (severe obesity) (Chronic)    Current Assessment & Plan     Wt Readings from Last 6 Encounters:   03/21/22 117.8 kg (259 lb 11.2 oz)   11/12/20 110.4 kg (243 lb 6.2 oz)   10/19/19 109.6 kg (241 lb 10 oz)   05/31/18 91.2 kg (201 lb)   04/11/18 91.5 kg (201 lb 11.5 oz)   01/17/12 89.1 kg (196 lb 7.6 oz)     Estimated body mass index is 36.22 kg/m² as calculated from the following:    Height as of this encounter: 5' 11" (1.803 m).    Weight as of this encounter: 117.8 kg (259 lb 11.2 oz).             Relevant Orders    Ambulatory referral/consult to [facility]    Moderate mixed hyperlipidemia not requiring statin therapy    Current Assessment & Plan     Lab Results   Component Value Date    CHOL 232 (H) 11/12/2020    CHOL 236 (H) 10/19/2019    TRIG 52 11/12/2020    TRIG 37 10/19/2019    HDL 70 11/12/2020    HDL 66 10/19/2019    LDLCALC 151.6 11/12/2020    LDLCALC 162.6 (H) 10/19/2019    NONHDLCHOL 162 11/12/2020    NONHDLCHOL 170 10/19/2019    AST 21 11/12/2020    ALT 17 11/12/2020     The 10-year ASCVD risk score (Kasandra GEIGER Jr., et al., 2013) is: 4.1%    Values used to calculate the score:      Age: 41 years      Sex: Male      Is Non- : Yes      Diabetic: No      Tobacco smoker: No      Systolic Blood Pressure: 110 mmHg      Is BP treated: Yes      HDL Cholesterol: 70 mg/dL      Total Cholesterol: 232 mg/dL            Relevant Orders    Lipid Panel    Comprehensive Metabolic Panel    Environmental and seasonal allergies    Relevant Medications    fluticasone propionate (FLONASE) 50 mcg/actuation nasal spray      Other Visit Diagnoses     Preventative health care    -  Primary    Relevant Orders    SCHEDULED EKG 12-LEAD (to [facility])    Lipid Panel    Comprehensive Metabolic Panel      Unless noted herein, any chronic conditions are represented as and appear compensated/controlled and stable, and no other significant complaints or concerns were " reported.    PRESCRIPTION DRUG MANAGEMENT  Outpatient Medications Prior to Visit   Medication Sig Dispense Refill    multivitamin (THERAGRAN) per tablet Take 1 tablet by mouth once daily.      tenofovir alafenamide (VEMLIDY) 25 mg Tab Vemlidy 25 mg tablet   Take 1 tablet every day by oral route for 30 days.      albuterol (VENTOLIN HFA) 90 mcg/actuation inhaler Inhale 2 puffs into the lungs every 8 (eight) hours as needed for Wheezing. (RESCUE INHALER) 18 g 5    fluticasone propionate (FLONASE) 50 mcg/actuation nasal spray 2 sprays (100 mcg total) by Each Nostril route once daily. 48 g 3    fluticasone propionate (FLOVENT HFA) 220 mcg/actuation inhaler Inhale 1 puff into the lungs 2 (two) times daily. (CONTROLLER MED) 90 g 3    losartan (COZAAR) 50 MG tablet Take 1 tablet (50 mg total) by mouth once daily. 90 tablet 4     No facility-administered medications prior to visit.     Medications Discontinued During This Encounter   Medication Reason    fluticasone propionate (FLOVENT HFA) 220 mcg/actuation inhaler Alternate therapy    losartan (COZAAR) 50 MG tablet Reorder    albuterol (VENTOLIN HFA) 90 mcg/actuation inhaler Reorder    fluticasone propionate (FLONASE) 50 mcg/actuation nasal spray Reorder     Medications Ordered This Encounter   Medications    albuterol (VENTOLIN HFA) 90 mcg/actuation inhaler     Sig: Inhale 2 puffs into the lungs every 8 (eight) hours as needed for Wheezing. (RESCUE INHALER)     Dispense:  36 g     Refill:  5     -    fluticasone propionate (FLONASE) 50 mcg/actuation nasal spray     Si sprays (100 mcg total) by Each Nostril route once daily.     Dispense:  48 g     Refill:  3    fluticasone-salmeterol diskus inhaler 250-50 mcg     Sig: Inhale 1 puff into the lungs 2 (two) times daily. Controller     Dispense:  180 each     Refill:  4     This REPLACES Flovent. DISCONTINUE any existing prescription for Flovent.    losartan (COZAAR) 50 MG tablet     Sig: Take 1 tablet (50  "mg total) by mouth once daily.     Dispense:  90 tablet     Refill:  4     -       Review of Systems   Constitutional: Negative for unexpected weight change.   HENT: Negative for hearing loss, rhinorrhea and trouble swallowing.    Eyes: Negative for discharge and visual disturbance.   Cardiovascular: Negative for chest pain and palpitations.   Gastrointestinal: Negative for constipation, diarrhea and vomiting.   Endocrine: Negative for polydipsia and polyuria.   Genitourinary: Negative for difficulty urinating and hematuria.   Musculoskeletal: Negative for arthralgias, joint swelling and neck pain.   Neurological: Negative for weakness and headaches.   Psychiatric/Behavioral: Negative for confusion and dysphoric mood.      PHYSICAL EXAM  Vitals:    03/21/22 1541   BP: 110/80   BP Location: Right arm   Patient Position: Sitting   BP Method: Large (Manual)   Pulse: 82   Temp: 98.7 °F (37.1 °C)   TempSrc: Tympanic   SpO2: 98%   Weight: 117.8 kg (259 lb 11.2 oz)   Height: 5' 11" (1.803 m)   CONSTITUTIONAL: Vital signs noted. No apparent distress. Does not appear acutely ill or septic. Appears adequately hydrated.  EYE: Sclerae anicteric. Lids and conjunctiva unremarkable.  ENT: External ENT unremarkable. Hearing grossly intact.  PULM: Lungs clear. Breathing unlabored.  CV: Auscultation reveals regular rate and rhythm. Normal heart sounds. No carotid bruit.  GI: Soft and nontender. Bowel sounds normal.  DERM: Skin warm and moist with normal turgor.  NEURO: There are no gross focal motor deficits or gross deficits of cranial nerves III-XII.  PSYCH: Alert and oriented x 3. Mood is grossly neutral. Affect appropriate. Judgment and insight grossly intact.     PAST MEDICAL HISTORY  Andrew has a past medical history of Asthma, Chronic viral hepatitis B, Class 2 obesity due to excess calories with serious comorbidity and body mass index (BMI) of 36.0 to 36.9 (severe obesity), Essential hypertension, Hepatitis B, Hyperlipidemia, " "Hypertension, Mild intermittent asthma without complication, and Mild persistent asthma without complication.    SURGICAL HISTORY  Andrew has a past surgical history that includes Knee arthroscopy (Right, 1995) and Liver biopsy (2017).    FAMILY HISTORY  Andrew family history includes Diabetes in his father and maternal grandmother; Heart failure in his maternal grandmother; Hypertension in his father and mother.     ALLERGIES  Review of patient's allergies indicates:  No Known Allergies    SOCIAL HISTORY  Andrew  reports that he has never smoked. He has never used smokeless tobacco. He reports that he does not drink alcohol.     Documentation entered by me for this encounter may have been done in part using speech-recognition technology. Although I have made an effort to ensure accuracy, "sound like" errors may exist and should be interpreted in context.       Hi, Andrew.    I'm writing to see how you are doing on your Advair.    Are you still needing the rescue inhaler 4 or more times per week?    All the best,    CRISTIAN Mike MD   --------------------------------------------------------------------------------  This message was composed 03/21/2022 and queued to send about two and a half months in the future.     "

## 2022-03-21 NOTE — ASSESSMENT & PLAN NOTE
Lab Results   Component Value Date    CHOL 232 (H) 11/12/2020    CHOL 236 (H) 10/19/2019    TRIG 52 11/12/2020    TRIG 37 10/19/2019    HDL 70 11/12/2020    HDL 66 10/19/2019    LDLCALC 151.6 11/12/2020    LDLCALC 162.6 (H) 10/19/2019    NONHDLCHOL 162 11/12/2020    NONHDLCHOL 170 10/19/2019    AST 21 11/12/2020    ALT 17 11/12/2020     The 10-year ASCVD risk score (Kasandra GEIGER Jr., et al., 2013) is: 4.1%    Values used to calculate the score:      Age: 41 years      Sex: Male      Is Non- : Yes      Diabetic: No      Tobacco smoker: No      Systolic Blood Pressure: 110 mmHg      Is BP treated: Yes      HDL Cholesterol: 70 mg/dL      Total Cholesterol: 232 mg/dL

## 2022-03-24 ENCOUNTER — OFFICE VISIT (OUTPATIENT)
Dept: PRIMARY CARE CLINIC | Facility: CLINIC | Age: 42
End: 2022-03-24
Payer: COMMERCIAL

## 2022-03-24 ENCOUNTER — LAB VISIT (OUTPATIENT)
Dept: LAB | Facility: HOSPITAL | Age: 42
End: 2022-03-24
Attending: FAMILY MEDICINE
Payer: COMMERCIAL

## 2022-03-24 ENCOUNTER — HOSPITAL ENCOUNTER (OUTPATIENT)
Dept: CARDIOLOGY | Facility: HOSPITAL | Age: 42
Discharge: HOME | End: 2022-03-24
Attending: FAMILY MEDICINE
Payer: COMMERCIAL

## 2022-03-24 ENCOUNTER — TELEPHONE (OUTPATIENT)
Dept: INTERNAL MEDICINE | Facility: CLINIC | Age: 42
End: 2022-03-24
Payer: COMMERCIAL

## 2022-03-24 VITALS
DIASTOLIC BLOOD PRESSURE: 88 MMHG | WEIGHT: 257.25 LBS | HEIGHT: 71 IN | SYSTOLIC BLOOD PRESSURE: 122 MMHG | BODY MASS INDEX: 36.02 KG/M2

## 2022-03-24 DIAGNOSIS — E66.01 CLASS 2 SEVERE OBESITY DUE TO EXCESS CALORIES WITH SERIOUS COMORBIDITY AND BODY MASS INDEX (BMI) OF 36.0 TO 36.9 IN ADULT: ICD-10-CM

## 2022-03-24 DIAGNOSIS — E78.2 MODERATE MIXED HYPERLIPIDEMIA NOT REQUIRING STATIN THERAPY: Primary | ICD-10-CM

## 2022-03-24 DIAGNOSIS — I10 ESSENTIAL HYPERTENSION: Chronic | ICD-10-CM

## 2022-03-24 DIAGNOSIS — I10 ESSENTIAL HYPERTENSION: ICD-10-CM

## 2022-03-24 DIAGNOSIS — E78.2 MODERATE MIXED HYPERLIPIDEMIA NOT REQUIRING STATIN THERAPY: ICD-10-CM

## 2022-03-24 DIAGNOSIS — Z00.00 PREVENTATIVE HEALTH CARE: ICD-10-CM

## 2022-03-24 LAB
ALBUMIN SERPL BCP-MCNC: 3.7 G/DL (ref 3.5–5.2)
ALP SERPL-CCNC: 57 U/L (ref 55–135)
ALT SERPL W/O P-5'-P-CCNC: 15 U/L (ref 10–44)
ANION GAP SERPL CALC-SCNC: 7 MMOL/L (ref 8–16)
AST SERPL-CCNC: 15 U/L (ref 10–40)
BILIRUB SERPL-MCNC: 1 MG/DL (ref 0.1–1)
BUN SERPL-MCNC: 15 MG/DL (ref 6–20)
CALCIUM SERPL-MCNC: 9.5 MG/DL (ref 8.7–10.5)
CHLORIDE SERPL-SCNC: 101 MMOL/L (ref 95–110)
CHOLEST SERPL-MCNC: 216 MG/DL (ref 120–199)
CHOLEST/HDLC SERPL: 4 {RATIO} (ref 2–5)
CO2 SERPL-SCNC: 29 MMOL/L (ref 23–29)
CREAT SERPL-MCNC: 1 MG/DL (ref 0.5–1.4)
EST. GFR  (AFRICAN AMERICAN): >60 ML/MIN/1.73 M^2
EST. GFR  (NON AFRICAN AMERICAN): >60 ML/MIN/1.73 M^2
GLUCOSE SERPL-MCNC: 96 MG/DL (ref 70–110)
HDLC SERPL-MCNC: 54 MG/DL (ref 40–75)
HDLC SERPL: 25 % (ref 20–50)
LDLC SERPL CALC-MCNC: 151.2 MG/DL (ref 63–159)
NONHDLC SERPL-MCNC: 162 MG/DL
POTASSIUM SERPL-SCNC: 4.2 MMOL/L (ref 3.5–5.1)
PROT SERPL-MCNC: 7.3 G/DL (ref 6–8.4)
SODIUM SERPL-SCNC: 137 MMOL/L (ref 136–145)
TRIGL SERPL-MCNC: 54 MG/DL (ref 30–150)

## 2022-03-24 PROCEDURE — 99203 OFFICE O/P NEW LOW 30 MIN: CPT | Mod: S$GLB,,, | Performed by: PHYSICIAN ASSISTANT

## 2022-03-24 PROCEDURE — 80061 LIPID PANEL: CPT | Performed by: FAMILY MEDICINE

## 2022-03-24 PROCEDURE — 3079F PR MOST RECENT DIASTOLIC BLOOD PRESSURE 80-89 MM HG: ICD-10-PCS | Mod: CPTII,S$GLB,, | Performed by: PHYSICIAN ASSISTANT

## 2022-03-24 PROCEDURE — 99203 PR OFFICE/OUTPT VISIT, NEW, LEVL III, 30-44 MIN: ICD-10-PCS | Mod: S$GLB,,, | Performed by: PHYSICIAN ASSISTANT

## 2022-03-24 PROCEDURE — 3008F PR BODY MASS INDEX (BMI) DOCUMENTED: ICD-10-PCS | Mod: CPTII,S$GLB,, | Performed by: PHYSICIAN ASSISTANT

## 2022-03-24 PROCEDURE — 80053 COMPREHEN METABOLIC PANEL: CPT | Performed by: FAMILY MEDICINE

## 2022-03-24 PROCEDURE — 3074F PR MOST RECENT SYSTOLIC BLOOD PRESSURE < 130 MM HG: ICD-10-PCS | Mod: CPTII,S$GLB,, | Performed by: PHYSICIAN ASSISTANT

## 2022-03-24 PROCEDURE — 99999 PR PBB SHADOW E&M-EST. PATIENT-LVL IV: ICD-10-PCS | Mod: PBBFAC,,, | Performed by: PHYSICIAN ASSISTANT

## 2022-03-24 PROCEDURE — 93005 ELECTROCARDIOGRAM TRACING: CPT

## 2022-03-24 PROCEDURE — 3079F DIAST BP 80-89 MM HG: CPT | Mod: CPTII,S$GLB,, | Performed by: PHYSICIAN ASSISTANT

## 2022-03-24 PROCEDURE — 1159F PR MEDICATION LIST DOCUMENTED IN MEDICAL RECORD: ICD-10-PCS | Mod: CPTII,S$GLB,, | Performed by: PHYSICIAN ASSISTANT

## 2022-03-24 PROCEDURE — 93010 ELECTROCARDIOGRAM REPORT: CPT | Mod: ,,, | Performed by: INTERNAL MEDICINE

## 2022-03-24 PROCEDURE — 4010F PR ACE/ARB THEARPY RXD/TAKEN: ICD-10-PCS | Mod: CPTII,S$GLB,, | Performed by: PHYSICIAN ASSISTANT

## 2022-03-24 PROCEDURE — 36415 COLL VENOUS BLD VENIPUNCTURE: CPT | Performed by: FAMILY MEDICINE

## 2022-03-24 PROCEDURE — 3074F SYST BP LT 130 MM HG: CPT | Mod: CPTII,S$GLB,, | Performed by: PHYSICIAN ASSISTANT

## 2022-03-24 PROCEDURE — 1159F MED LIST DOCD IN RCRD: CPT | Mod: CPTII,S$GLB,, | Performed by: PHYSICIAN ASSISTANT

## 2022-03-24 PROCEDURE — 93010 EKG 12-LEAD: ICD-10-PCS | Mod: ,,, | Performed by: INTERNAL MEDICINE

## 2022-03-24 PROCEDURE — 99999 PR PBB SHADOW E&M-EST. PATIENT-LVL IV: CPT | Mod: PBBFAC,,, | Performed by: PHYSICIAN ASSISTANT

## 2022-03-24 PROCEDURE — 3008F BODY MASS INDEX DOCD: CPT | Mod: CPTII,S$GLB,, | Performed by: PHYSICIAN ASSISTANT

## 2022-03-24 PROCEDURE — 4010F ACE/ARB THERAPY RXD/TAKEN: CPT | Mod: CPTII,S$GLB,, | Performed by: PHYSICIAN ASSISTANT

## 2022-03-24 NOTE — PROGRESS NOTES
Subjective:       Patient ID: Andrew Allen is a 41 y.o. male.    Chief Complaint: obesity   HPI     Patient is a 42 yo male who presents today from PCP   He has hypertension, HLD, obesity and can benefit from lifestyle changes   He has lost weight before and knows the basics and beyond about healthy eating and the benefit of exercise     Stress - no issues   Physical Activity: He is getting back into his physical activity regiment today   Diet:  Has been eating more snacks, processed foods, late night eating   Beverages: mainly water and juice   Substance abuse: none        Health Maintenance Due   Topic Date Due    Influenza Vaccine (1) 09/01/2021       Past Medical History:   Diagnosis Date    Asthma     Childhood    Chronic viral hepatitis B 10/19/2019    Class 2 obesity due to excess calories with serious comorbidity and body mass index (BMI) of 36.0 to 36.9 (severe obesity) 3/21/2022    Essential hypertension 10/19/2019    Hepatitis B 2015    Hyperlipidemia 2011    Hypertension     Mild intermittent asthma without complication 10/19/2019    Mild persistent asthma without complication 10/19/2019       Current Outpatient Medications   Medication Sig Dispense Refill    albuterol (VENTOLIN HFA) 90 mcg/actuation inhaler Inhale 2 puffs into the lungs every 8 (eight) hours as needed for Wheezing. (RESCUE INHALER) 36 g 5    fluticasone propionate (FLONASE) 50 mcg/actuation nasal spray 2 sprays (100 mcg total) by Each Nostril route once daily. 48 g 3    fluticasone-salmeterol diskus inhaler 250-50 mcg Inhale 1 puff into the lungs 2 (two) times daily. Controller 180 each 4    losartan (COZAAR) 50 MG tablet Take 1 tablet (50 mg total) by mouth once daily. 90 tablet 4    multivitamin (THERAGRAN) per tablet Take 1 tablet by mouth once daily.      tenofovir alafenamide (VEMLIDY) 25 mg Tab Vemlidy 25 mg tablet   Take 1 tablet every day by oral route for 30 days.       No current facility-administered  "medications for this visit.       Review of Systems   Constitutional: Negative for fatigue, fever and unexpected weight change.   HENT: Negative for sore throat and trouble swallowing.    Respiratory: Positive for cough (asthma ). Negative for shortness of breath.    Cardiovascular: Negative for chest pain.   Gastrointestinal: Negative for abdominal pain.   Hematological: Negative for adenopathy. Does not bruise/bleed easily.   All other systems reviewed and are negative.      Objective:   /88   Ht 5' 11" (1.803 m)   Wt 116.7 kg (257 lb 4.4 oz)   BMI 35.88 kg/m²      Physical Exam  Constitutional:       Appearance: Normal appearance. He is obese.   HENT:      Head: Normocephalic and atraumatic.   Pulmonary:      Effort: Pulmonary effort is normal.   Neurological:      General: No focal deficit present.      Mental Status: He is alert and oriented to person, place, and time.   Psychiatric:         Mood and Affect: Mood normal.         Behavior: Behavior normal.           Lab Results   Component Value Date    WBC 4.25 (L) 07/06/2010    HGB 14.1 07/06/2010    HCT 43.0 07/06/2010     07/06/2010    CHOL 216 (H) 03/24/2022    TRIG 54 03/24/2022    HDL 54 03/24/2022    ALT 15 03/24/2022    AST 15 03/24/2022     03/24/2022    K 4.2 03/24/2022     03/24/2022    CREATININE 1.0 03/24/2022    BUN 15 03/24/2022    CO2 29 03/24/2022    TSH 0.540 05/19/2010       Assessment:       1. Moderate mixed hyperlipidemia not requiring statin therapy    2. Class 2 obesity due to excess calories with serious comorbidity and body mass index (BMI) of 36.0 to 36.9 (severe obesity)    3. Essential hypertension        Plan:   Moderate mixed hyperlipidemia not requiring statin therapy  -     Ambulatory Referral/Consult to Nutrition Services - [facility]; Future; Expected date: 03/31/2022    Class 2 obesity due to excess calories with serious comorbidity and body mass index (BMI) of 36.0 to 36.9 (severe " obesity)  -     Ambulatory Referral/Consult to Nutrition Services - [facility]; Future; Expected date: 03/31/2022  -     Ambulatory referral/consult to [facility]    Essential hypertension  -     Ambulatory Referral/Consult to Nutrition Services - [facility]; Future; Expected date: 03/31/2022  -     Ambulatory referral/consult to [facility]        Low sodium diet - 2000mg or less   Download Myster[employer] pal- start tracking foods with goal of 1lb loss per week   150 mins of exercise weekly, minimum   Cut back on processed foods/snack    See back 4 weeks for measurements       The 10-year ASCVD risk score (Kasandrafco GEIGER Jr., et al., 2013) is: 4.4%    Values used to calculate the score:      Age: 41 years      Sex: Male      Is Non- : Yes      Diabetic: No      Tobacco smoker: No      Systolic Blood Pressure: 110 mmHg      Is BP treated: Yes      HDL Cholesterol: 54 mg/dL      Total Cholesterol: 216 mg/dL

## 2022-03-24 NOTE — PATIENT INSTRUCTIONS
DIET-    low simple carb, low processed food  diet   High in fiber, fruits, lean means, nuts   [email or web address]     PHYSICAL ACTIVITY-  goal is 5 days a week, 30 mins a day to equal 150 mins a week     NO JUNK FOOD- LIMIT PROCESSED FOOD     [email or web address]   MAKE YOUR OWN SNACKS

## 2022-03-25 ENCOUNTER — PATIENT MESSAGE (OUTPATIENT)
Dept: INTERNAL MEDICINE | Facility: CLINIC | Age: 42
End: 2022-03-25
Payer: COMMERCIAL

## 2022-03-25 ENCOUNTER — PATIENT MESSAGE (OUTPATIENT)
Dept: PRIMARY CARE CLINIC | Facility: CLINIC | Age: 42
End: 2022-03-25
Payer: COMMERCIAL

## 2022-03-28 NOTE — PROGRESS NOTES
"Hi, Andrew.    I'm happy to report that these results are fine and do not require a change in treatment at this point. We'll review these results at your next appointment. I look forward to seeing you then.     All the best,    CRISTIAN Mike MD  P.S. Want to learn more about your test results and what they mean? It's as simple as 1, 2, 3.     (1) Log in to your [email or web address] account at [email or web address]     (2) From the "Test Results" tab, click on the test you want to know more about.     (3) Click on the "About This Test" link. "

## 2022-04-06 ENCOUNTER — PATIENT MESSAGE (OUTPATIENT)
Dept: PRIMARY CARE CLINIC | Facility: CLINIC | Age: 42
End: 2022-04-06
Payer: COMMERCIAL

## 2022-04-06 ENCOUNTER — PATIENT MESSAGE (OUTPATIENT)
Dept: INTERNAL MEDICINE | Facility: CLINIC | Age: 42
End: 2022-04-06
Payer: COMMERCIAL

## 2022-04-16 DIAGNOSIS — J45.30 MILD PERSISTENT ASTHMA WITHOUT COMPLICATION: Chronic | ICD-10-CM

## 2022-04-16 NOTE — TELEPHONE ENCOUNTER
No new care gaps identified.  Powered by [employer]. Reference number: [id number].   4/16/2022 12:15:57 AM CDT

## 2022-04-20 RX ORDER — ALBUTEROL SULFATE 90 UG/1
2 AEROSOL, METERED RESPIRATORY (INHALATION) EVERY 8 HOURS PRN
Qty: 18 G | Refills: 5 | Status: SHIPPED | OUTPATIENT
Start: 2022-04-20 | End: 2022-08-14 | Stop reason: SDUPTHER

## 2022-04-20 NOTE — TELEPHONE ENCOUNTER
REFILL REQUEST APPROVED.  Requested Prescriptions   Pending Prescriptions Disp Refills    albuterol (PROVENTIL/VENTOLIN HFA) 90 mcg/actuation inhaler [Pharmacy Med Name: ALBUTEROL HFA (VENTOLIN) INH] 18 g 5     Sig: INHALE 2 PUFFS INTO THE LUNGS EVERY 8 (EIGHT) HOURS AS NEEDED FOR WHEEZING. (RESCUE INHALER)

## 2022-05-30 ENCOUNTER — PATIENT MESSAGE (OUTPATIENT)
Dept: INTERNAL MEDICINE | Facility: CLINIC | Age: 42
End: 2022-05-30
Payer: COMMERCIAL

## 2022-06-02 NOTE — TELEPHONE ENCOUNTER
Hi, Andrew.    I'm so glad to hear that you are doing better.    Let me know if I can do anything else for you.    All the best,    CRISTIAN Mike MD

## 2022-08-14 DIAGNOSIS — J45.30 MILD PERSISTENT ASTHMA WITHOUT COMPLICATION: Chronic | ICD-10-CM

## 2022-08-14 NOTE — TELEPHONE ENCOUNTER
No new care gaps identified.  [facility] Embedded Care Gaps. Reference number: [id number]. 8/14/2022   7:56:27 AM NUT

## 2022-08-19 RX ORDER — ALBUTEROL SULFATE 90 UG/1
2 AEROSOL, METERED RESPIRATORY (INHALATION) EVERY 8 HOURS PRN
Qty: 18 G | Refills: 5 | Status: SHIPPED | OUTPATIENT
Start: 2022-08-19 | End: 2022-10-21 | Stop reason: SDUPTHER

## 2022-08-19 NOTE — TELEPHONE ENCOUNTER
REFILL REQUEST APPROVED.  Requested Prescriptions   Pending Prescriptions Disp Refills    albuterol (PROVENTIL/VENTOLIN HFA) 90 mcg/actuation inhaler 18 g 5     Sig: Inhale 2 puffs into the lungs every 8 (eight) hours as needed for Wheezing. (RESCUE INHALER)

## 2023-01-31 ENCOUNTER — PATIENT OUTREACH (OUTPATIENT)
Dept: ADMINISTRATIVE | Facility: HOSPITAL | Age: 43
End: 2023-01-31
Payer: COMMERCIAL

## 2023-03-29 ENCOUNTER — PATIENT OUTREACH (OUTPATIENT)
Dept: ADMINISTRATIVE | Facility: HOSPITAL | Age: 43
End: 2023-03-29
Payer: COMMERCIAL

## 2023-03-29 NOTE — PROGRESS NOTES
Working HTN report:     Called to discuss scheduling appointment and to get updated BP reading.  NO answer, left message.

## 2023-04-26 DIAGNOSIS — I10 ESSENTIAL HYPERTENSION: ICD-10-CM

## 2023-05-02 ENCOUNTER — OFFICE VISIT (OUTPATIENT)
Dept: INTERNAL MEDICINE | Facility: CLINIC | Age: 43
End: 2023-05-02
Payer: COMMERCIAL

## 2023-05-02 ENCOUNTER — LAB VISIT (OUTPATIENT)
Dept: LAB | Facility: HOSPITAL | Age: 43
End: 2023-05-02
Attending: FAMILY MEDICINE
Payer: COMMERCIAL

## 2023-05-02 VITALS
TEMPERATURE: 98 F | HEIGHT: 71 IN | DIASTOLIC BLOOD PRESSURE: 86 MMHG | OXYGEN SATURATION: 97 % | SYSTOLIC BLOOD PRESSURE: 126 MMHG | BODY MASS INDEX: 34.07 KG/M2 | WEIGHT: 243.38 LBS | HEART RATE: 82 BPM

## 2023-05-02 DIAGNOSIS — E78.2 MODERATE MIXED HYPERLIPIDEMIA NOT REQUIRING STATIN THERAPY: ICD-10-CM

## 2023-05-02 DIAGNOSIS — B18.1 CHRONIC VIRAL HEPATITIS B: Chronic | ICD-10-CM

## 2023-05-02 DIAGNOSIS — Z00.00 PREVENTATIVE HEALTH CARE: ICD-10-CM

## 2023-05-02 DIAGNOSIS — J30.89 ENVIRONMENTAL AND SEASONAL ALLERGIES: ICD-10-CM

## 2023-05-02 DIAGNOSIS — Z23 NEED FOR PNEUMOCOCCAL VACCINE: ICD-10-CM

## 2023-05-02 DIAGNOSIS — E66.09 CLASS 1 OBESITY DUE TO EXCESS CALORIES WITH SERIOUS COMORBIDITY AND BODY MASS INDEX (BMI) OF 33.0 TO 33.9 IN ADULT: ICD-10-CM

## 2023-05-02 DIAGNOSIS — I10 ESSENTIAL HYPERTENSION: Chronic | ICD-10-CM

## 2023-05-02 DIAGNOSIS — Z00.00 PREVENTATIVE HEALTH CARE: Primary | ICD-10-CM

## 2023-05-02 DIAGNOSIS — J45.30 MILD PERSISTENT ASTHMA WITHOUT COMPLICATION: Chronic | ICD-10-CM

## 2023-05-02 PROBLEM — E66.811 CLASS 1 DRUG-INDUCED OBESITY WITH SERIOUS COMORBIDITY AND BODY MASS INDEX (BMI) OF 33.0 TO 33.9 IN ADULT: Chronic | Status: ACTIVE | Noted: 2022-03-21

## 2023-05-02 PROBLEM — E66.811 CLASS 1 OBESITY DUE TO EXCESS CALORIES WITH SERIOUS COMORBIDITY AND BODY MASS INDEX (BMI) OF 33.0 TO 33.9 IN ADULT: Status: ACTIVE | Noted: 2022-03-21

## 2023-05-02 PROBLEM — E66.1 CLASS 1 DRUG-INDUCED OBESITY WITH SERIOUS COMORBIDITY AND BODY MASS INDEX (BMI) OF 33.0 TO 33.9 IN ADULT: Chronic | Status: ACTIVE | Noted: 2022-03-21

## 2023-05-02 PROCEDURE — 99396 PREV VISIT EST AGE 40-64: CPT | Mod: 25,S$GLB,, | Performed by: FAMILY MEDICINE

## 2023-05-02 PROCEDURE — 99999 PR PBB SHADOW E&M-EST. PATIENT-LVL V: CPT | Mod: PBBFAC,,, | Performed by: FAMILY MEDICINE

## 2023-05-02 PROCEDURE — 3044F PR MOST RECENT HEMOGLOBIN A1C LEVEL <7.0%: ICD-10-PCS | Mod: CPTII,S$GLB,, | Performed by: FAMILY MEDICINE

## 2023-05-02 PROCEDURE — 99999 PR PBB SHADOW E&M-EST. PATIENT-LVL V: ICD-10-PCS | Mod: PBBFAC,,, | Performed by: FAMILY MEDICINE

## 2023-05-02 PROCEDURE — 3008F BODY MASS INDEX DOCD: CPT | Mod: CPTII,S$GLB,, | Performed by: FAMILY MEDICINE

## 2023-05-02 PROCEDURE — 3079F PR MOST RECENT DIASTOLIC BLOOD PRESSURE 80-89 MM HG: ICD-10-PCS | Mod: CPTII,S$GLB,, | Performed by: FAMILY MEDICINE

## 2023-05-02 PROCEDURE — 86706 HEP B SURFACE ANTIBODY: CPT | Mod: 91 | Performed by: FAMILY MEDICINE

## 2023-05-02 PROCEDURE — 80061 LIPID PANEL: CPT | Performed by: FAMILY MEDICINE

## 2023-05-02 PROCEDURE — 86705 HEP B CORE ANTIBODY IGM: CPT | Performed by: FAMILY MEDICINE

## 2023-05-02 PROCEDURE — 3008F PR BODY MASS INDEX (BMI) DOCUMENTED: ICD-10-PCS | Mod: CPTII,S$GLB,, | Performed by: FAMILY MEDICINE

## 2023-05-02 PROCEDURE — 87517 HEPATITIS B DNA QUANT: CPT | Performed by: FAMILY MEDICINE

## 2023-05-02 PROCEDURE — 1159F MED LIST DOCD IN RCRD: CPT | Mod: CPTII,S$GLB,, | Performed by: FAMILY MEDICINE

## 2023-05-02 PROCEDURE — 36415 COLL VENOUS BLD VENIPUNCTURE: CPT | Performed by: FAMILY MEDICINE

## 2023-05-02 PROCEDURE — 86382 NEUTRALIZATION TEST VIRAL: CPT | Performed by: FAMILY MEDICINE

## 2023-05-02 PROCEDURE — 87340 HEPATITIS B SURFACE AG IA: CPT | Performed by: FAMILY MEDICINE

## 2023-05-02 PROCEDURE — 3074F SYST BP LT 130 MM HG: CPT | Mod: CPTII,S$GLB,, | Performed by: FAMILY MEDICINE

## 2023-05-02 PROCEDURE — 3079F DIAST BP 80-89 MM HG: CPT | Mod: CPTII,S$GLB,, | Performed by: FAMILY MEDICINE

## 2023-05-02 PROCEDURE — 90677 PNEUMOCOCCAL CONJUGATE VACCINE 20-VALENT: ICD-10-PCS | Mod: S$GLB,,, | Performed by: FAMILY MEDICINE

## 2023-05-02 PROCEDURE — 99396 PR PREVENTIVE VISIT,EST,40-64: ICD-10-PCS | Mod: 25,S$GLB,, | Performed by: FAMILY MEDICINE

## 2023-05-02 PROCEDURE — 87350 HEPATITIS BE AG IA: CPT | Performed by: FAMILY MEDICINE

## 2023-05-02 PROCEDURE — 3044F HG A1C LEVEL LT 7.0%: CPT | Mod: CPTII,S$GLB,, | Performed by: FAMILY MEDICINE

## 2023-05-02 PROCEDURE — 4010F ACE/ARB THERAPY RXD/TAKEN: CPT | Mod: CPTII,S$GLB,, | Performed by: FAMILY MEDICINE

## 2023-05-02 PROCEDURE — 4010F PR ACE/ARB THEARPY RXD/TAKEN: ICD-10-PCS | Mod: CPTII,S$GLB,, | Performed by: FAMILY MEDICINE

## 2023-05-02 PROCEDURE — 3074F PR MOST RECENT SYSTOLIC BLOOD PRESSURE < 130 MM HG: ICD-10-PCS | Mod: CPTII,S$GLB,, | Performed by: FAMILY MEDICINE

## 2023-05-02 PROCEDURE — 83036 HEMOGLOBIN GLYCOSYLATED A1C: CPT | Performed by: FAMILY MEDICINE

## 2023-05-02 PROCEDURE — 90471 IMMUNIZATION ADMIN: CPT | Mod: S$GLB,,, | Performed by: FAMILY MEDICINE

## 2023-05-02 PROCEDURE — 90471 PNEUMOCOCCAL CONJUGATE VACCINE 20-VALENT: ICD-10-PCS | Mod: S$GLB,,, | Performed by: FAMILY MEDICINE

## 2023-05-02 PROCEDURE — 86707 HEPATITIS BE ANTIBODY: CPT | Performed by: FAMILY MEDICINE

## 2023-05-02 PROCEDURE — 90677 PCV20 VACCINE IM: CPT | Mod: S$GLB,,, | Performed by: FAMILY MEDICINE

## 2023-05-02 PROCEDURE — 1159F PR MEDICATION LIST DOCUMENTED IN MEDICAL RECORD: ICD-10-PCS | Mod: CPTII,S$GLB,, | Performed by: FAMILY MEDICINE

## 2023-05-02 PROCEDURE — 80053 COMPREHEN METABOLIC PANEL: CPT | Performed by: FAMILY MEDICINE

## 2023-05-02 RX ORDER — FLUTICASONE PROPIONATE AND SALMETEROL 250; 50 UG/1; UG/1
1 POWDER RESPIRATORY (INHALATION) 2 TIMES DAILY
Qty: 180 EACH | Refills: 3 | Status: SHIPPED | OUTPATIENT
Start: 2023-05-02 | End: 2023-05-23 | Stop reason: SDUPTHER

## 2023-05-02 RX ORDER — ALBUTEROL SULFATE 90 UG/1
2 AEROSOL, METERED RESPIRATORY (INHALATION) EVERY 8 HOURS PRN
Qty: 18 G | Refills: 5 | Status: SHIPPED | OUTPATIENT
Start: 2023-05-02 | End: 2023-05-23 | Stop reason: SDUPTHER

## 2023-05-02 RX ORDER — LOSARTAN POTASSIUM 50 MG/1
50 TABLET ORAL DAILY
Qty: 90 TABLET | Refills: 3 | Status: SHIPPED | OUTPATIENT
Start: 2023-05-02 | End: 2023-05-23 | Stop reason: SDUPTHER

## 2023-05-02 RX ORDER — FLUTICASONE PROPIONATE 50 MCG
2 SPRAY, SUSPENSION (ML) NASAL DAILY
Qty: 48 G | Refills: 3 | Status: SHIPPED | OUTPATIENT
Start: 2023-05-02 | End: 2023-05-23 | Stop reason: SDUPTHER

## 2023-05-02 NOTE — PATIENT INSTRUCTIONS
I recommend that you get the new bi-valent COVID-19 vaccine booster that protects against the Omicron sub-variants of COVID-19.    People who are fully vaccinated are much better protected from severe illness (hospitalization and death) from COVID-19 than people who are unvaccinated or not fully vaccinated.    You can learn more about the COVID-19 vaccine and booster shot options at [email or web address].    You can schedule an appointment for your COVID-19 booster vaccination with [facility] or by calling [phone].    Please take care of yourself. You are important to me.      --------------------------------------------------------------------------------     Weight Loss Tips and Diet    More and more people are concerned about their weight, and there are many different weight loss programs to choose from. The goal of a weight loss program may be to cut down on calories, lose extra weight through exercise, or both. A healthy weight loss goal is 1 to 2 pounds (0.5 to 1 kg) per week. Combining a healthy diet with regular physical activity can help you achieve the best results.    To lose weight and stay healthy, it's essential to make lifestyle changes, including taking time to exercise, choosing healthy snacks and meals, planning your meals in advance, and learning not to overeat. You should also pay attention to portion control and monitor your calorie intake.    When creating a healthy weight loss diet, consider the following tips:  Choose lean proteins, such as skinless chicken breast, lean beef, pork loin, lean turkey meat, and fresh fish.  Opt for low-fat dairy products, such as 1% or skim milk, low-fat or fat-free cheese, and frozen yogurt or low-calorie ice cream.  Eat fresh fruits, vegetables, beans and lentils, and whole wheat products more often.  Choose water to drink more often, and aim to get your calories from the foods you eat.  Limit high-fat, high-sodium, and  high-calorie foods like fried foods, processed meats, whole-fat dairy products, candy, cookies, chips, pastries, sausage, mosher, any full-fat meats, soda, juice, and alcoholic beverages.  If you have a chronic illness or need personalized advice, consult your doctor or a dietitian to help you set your weight loss goals and create a plan tailored to your needs. They can help you determine the best diet and exercise regimen for you.    For additional support, consider joining a support group or exercise group. It is much easier to lose weight if you have encouragement from others on a similar journey.    Finally, educate yourself on nutrition by reading labels and understanding serving sizes. This will help you make healthier choices and lose weight effectively. Remember that even low-calorie foods can add up, so always count the calories you eat.    Losing weight may make you healthier, give you more energy for your daily activities, lower your risk of disease, and even add years to your life. By adopting a balanced approach to weight loss through diet and exercise, you can achieve lasting results and improve your overall well-being.

## 2023-05-02 NOTE — ASSESSMENT & PLAN NOTE
Lab Results   Component Value Date    CHOL 216 (H) 03/24/2022    CHOL 232 (H) 11/12/2020    TRIG 54 03/24/2022    TRIG 52 11/12/2020    HDL 54 03/24/2022    HDL 70 11/12/2020    LDLCALC 151.2 03/24/2022    LDLCALC 151.6 11/12/2020    NONHDLCHOL 162 03/24/2022    NONHDLCHOL 162 11/12/2020    AST 15 03/24/2022    ALT 15 03/24/2022     The 10-year ASCVD risk score (Karrie PICKARD, et al., 2019) is: 5.9%    Values used to calculate the score:      Age: 42 years      Sex: Male      Is Non- : Yes      Diabetic: No      Tobacco smoker: No      Systolic Blood Pressure: 126 mmHg      Is BP treated: Yes      HDL Cholesterol: 54 mg/dL      Total Cholesterol: 216 mg/dL

## 2023-05-02 NOTE — PROGRESS NOTES
"  ANNUAL WELLNESS VISIT (PREVENTIVE SERVICES)  OFFICE VISIT 5/2/23  4:30 PM CDT    Subjective   CHIEF COMPLAINT: Annual Exam    Refer to the history and data embedded within the "Assessment & Plan" section below for the status of the conditions evaluated and managed today. Unless noted otherwise, chronic conditions appear compensated/controlled and stable.    Review of Systems   Constitutional:  Negative for activity change and unexpected weight change.   HENT:  Negative for hearing loss, rhinorrhea and trouble swallowing.    Eyes:  Negative for discharge and visual disturbance.   Respiratory:  Negative for chest tightness.    Cardiovascular:  Negative for chest pain and palpitations.   Gastrointestinal:  Negative for blood in stool, constipation, diarrhea and vomiting.   Endocrine: Negative for polydipsia and polyuria.   Genitourinary:  Negative for difficulty urinating, hematuria and urgency.   Musculoskeletal:  Negative for arthralgias, joint swelling and neck pain.   Neurological:  Negative for weakness and headaches.   Psychiatric/Behavioral:  Negative for confusion and dysphoric mood.        Objective   Vitals:    05/02/23 1631   BP: 126/86   BP Location: Right arm   Patient Position: Sitting   BP Method: Large (Manual)   Pulse: 82   Temp: 97.8 °F (36.6 °C)   TempSrc: Tympanic   SpO2: 97%   Weight: 110.4 kg (243 lb 6.2 oz)   Height: 5' 11" (1.803 m)   Physical Exam  Vitals reviewed.   Constitutional:       General: He is not in acute distress.     Appearance: Normal appearance. He is not ill-appearing, toxic-appearing or diaphoretic.   HENT:      Head: Normocephalic and atraumatic.      Right Ear: Tympanic membrane, ear canal and external ear normal.      Left Ear: Tympanic membrane, ear canal and external ear normal.      Ears:      Comments: Hearing grossly intact.  Eyes:      General: No scleral icterus.     Conjunctiva/sclera: Conjunctivae normal.   Neck:      Vascular: No carotid bruit.   Cardiovascular: "      Rate and Rhythm: Normal rate and regular rhythm.      Heart sounds: Normal heart sounds.   Pulmonary:      Effort: Pulmonary effort is normal.      Breath sounds: Normal breath sounds.   Abdominal:      General: Bowel sounds are normal. There is no distension.      Palpations: Abdomen is soft. There is no mass.      Tenderness: There is no abdominal tenderness.   Musculoskeletal:         General: No tenderness.      Cervical back: No muscular tenderness.   Lymphadenopathy:      Cervical: No cervical adenopathy.   Skin:     General: Skin is warm and dry.      Coloration: Skin is not jaundiced.   Neurological:      General: No focal deficit present.      Mental Status: He is alert and oriented to person, place, and time.      Cranial Nerves: No cranial nerve deficit.      Gait: Gait normal.   Psychiatric:         Mood and Affect: Mood normal.         Behavior: Behavior normal.         Judgment: Judgment normal.        Assessment and Plan   1. Preventative health care  -     Hemoglobin A1C; Future; Expected date: 05/02/2023  -     Comprehensive Metabolic Panel; Future; Expected date: 05/02/2023  -     Lipid Panel; Future; Expected date: 05/02/2023  -     Hepatitis B Surface Antigen; Future; Expected date: 05/02/2023  -     Hepatitis B e Antigen; Future; Expected date: 05/02/2023  -     Hepatitis B Core Antibody, IgM; Future; Expected date: 05/02/2023  -     Hepatitis B Surface Ab, Qualitative; Future; Expected date: 05/02/2023  -     Hepatitis B e antibody; Future; Expected date: 05/02/2023  -     HEPATITIS B VIRAL DNA, QUANTITATIVE; Future; Expected date: 05/02/2023  -     Pneumococcal Conjugate Vaccine (20 Valent) (IM)    2. Class 1 obesity due to excess calories with serious comorbidity and body mass index (BMI) of 33.0 to 33.9 in adult  Assessment & Plan:  Wt Readings from Last 6 Encounters:   05/02/23 110.4 kg (243 lb 6.2 oz)   03/24/22 116.7 kg (257 lb 4.4 oz)   03/21/22 117.8 kg (259 lb 11.2 oz)   11/12/20  "110.4 kg (243 lb 6.2 oz)   10/19/19 109.6 kg (241 lb 10 oz)   05/31/18 91.2 kg (201 lb)     Estimated body mass index is 33.95 kg/m² as calculated from the following:    Height as of this encounter: 5' 11" (1.803 m).    Weight as of this encounter: 110.4 kg (243 lb 6.2 oz).    Therapeutic lifestyle changes encouraged. Recommended referral to Lifestyle & Wellness clinic. Additional education/instructions were reviewed and shared with Andrew. Refer to After Visit Summary.    Orders:  -     Ambulatory referral/consult to [facility] Lifestyle and Wellness; Future; Expected date: 05/09/2023    3. Moderate mixed hyperlipidemia not requiring statin therapy  Assessment & Plan:  Lab Results   Component Value Date    CHOL 216 (H) 03/24/2022    CHOL 232 (H) 11/12/2020    TRIG 54 03/24/2022    TRIG 52 11/12/2020    HDL 54 03/24/2022    HDL 70 11/12/2020    LDLCALC 151.2 03/24/2022    LDLCALC 151.6 11/12/2020    NONHDLCHOL 162 03/24/2022    NONHDLCHOL 162 11/12/2020    AST 15 03/24/2022    ALT 15 03/24/2022     The 10-year ASCVD risk score (Karrie PICKARD, et al., 2019) is: 5.9%    Values used to calculate the score:      Age: 42 years      Sex: Male      Is Non- : Yes      Diabetic: No      Tobacco smoker: No      Systolic Blood Pressure: 126 mmHg      Is BP treated: Yes      HDL Cholesterol: 54 mg/dL      Total Cholesterol: 216 mg/dL     Orders:  -     Comprehensive Metabolic Panel; Future; Expected date: 05/02/2023  -     Lipid Panel; Future; Expected date: 05/02/2023    4. Essential hypertension  Assessment & Plan:  BP Readings from Last 6 Encounters:   05/02/23 126/86   03/24/22 122/88   03/21/22 110/80   11/12/20 120/80   10/19/19 128/86   05/31/18 120/83     Lab Results   Component Value Date    CREATININE 1.0 03/24/2022    BUN 15 03/24/2022    K 4.2 03/24/2022     03/24/2022     03/24/2022     Results for orders placed or performed during the hospital encounter of 03/24/22   SCHEDULED EKG 12-LEAD " (to Muse)    Collection Time: 03/24/22  4:14 PM    Narrative    Test Reason : I10    Vent. Rate : 065 BPM     Atrial Rate : 065 BPM     P-R Int : 178 ms          QRS Dur : 090 ms      QT Int : 378 ms       P-R-T Axes : 073 050 044 degrees     QTc Int : 393 ms    Sinus rhythm with marked sinus arrythmia  Otherwise normal ECG  No previous ECGs available  Confirmed by LAURA BROUSSARD MD ([id number]) on 3/24/2022 6:23:37 PM    Referred By: CRISTIAN GARCES           Confirmed By:LAURA BROUSSARD MD        Orders:  -     Comprehensive Metabolic Panel; Future; Expected date: 05/02/2023  -     Lipid Panel; Future; Expected date: 05/02/2023  -     losartan (COZAAR) 50 MG tablet; Take 1 tablet (50 mg total) by mouth once daily.  Dispense: 90 tablet; Refill: 3    5. Mild persistent asthma without complication  Assessment & Plan:  This is a chronic problem that appears compensated/controlled and stable.     Orders:  -     albuterol (PROVENTIL/VENTOLIN HFA) 90 mcg/actuation inhaler; Inhale 2 puffs into the lungs every 8 (eight) hours as needed for Wheezing. (RESCUE INHALER)  Dispense: 18 g; Refill: 5  -     fluticasone-salmeterol diskus inhaler 250-50 mcg; Inhale 1 puff into the lungs 2 (two) times daily. Controller  Dispense: 180 each; Refill: 3    6. Environmental and seasonal allergies  -     fluticasone propionate (FLONASE) 50 mcg/actuation nasal spray; 2 sprays (100 mcg total) by Each Nostril route once daily.  Dispense: 48 g; Refill: 3    7. Chronic viral hepatitis B  Overview:  GI: Jovanni Loera MD   [address] [facility] [place] [id number]       Orders:  -     Hemoglobin A1C; Future; Expected date: 05/02/2023  -     Comprehensive Metabolic Panel; Future; Expected date: 05/02/2023  -     Lipid Panel; Future; Expected date: 05/02/2023  -     Hepatitis B Surface Antigen; Future; Expected date: 05/02/2023  -     Hepatitis B e Antigen; Future; Expected date: 05/02/2023  -     Hepatitis B Core Antibody, IgM; Future;  Expected date: 05/02/2023  -     Hepatitis B Surface Ab, Qualitative; Future; Expected date: 05/02/2023  -     Hepatitis B e antibody; Future; Expected date: 05/02/2023  -     HEPATITIS B VIRAL DNA, QUANTITATIVE; Future; Expected date: 05/02/2023    8. Need for pneumococcal vaccine  -     Pneumococcal Conjugate Vaccine (20 Valent) (IM)    Unless noted herein, any chronic conditions are represented as and appear stable, and no other significant complaints or concerns were reported.    Follow up in about 1 year (around 5/2/2024) for annual wellness exam.      Assessment and Plan    1. Preventative health care  - Hemoglobin A1C; Future  - Comprehensive Metabolic Panel; Future  - Lipid Panel; Future  - Hepatitis B Surface Antigen; Future  - Hepatitis B e Antigen; Future  - Hepatitis B Core Antibody, IgM; Future  - Hepatitis B Surface Ab, Qualitative; Future  - Hepatitis B e antibody; Future  - HEPATITIS B VIRAL DNA, QUANTITATIVE; Future  - Pneumococcal Conjugate Vaccine (20 Valent) (IM)    2. Class 1 obesity due to excess calories with serious comorbidity and body mass index (BMI) of 33.0 to 33.9 in adult  - Ambulatory referral/consult to [facility] Lifestyle and Wellness; Future    3. Moderate mixed hyperlipidemia not requiring statin therapy  - Comprehensive Metabolic Panel; Future  - Lipid Panel; Future    4. Essential hypertension  - Comprehensive Metabolic Panel; Future  - Lipid Panel; Future  - losartan (COZAAR) 50 MG tablet; Take 1 tablet (50 mg total) by mouth once daily.  Dispense: 90 tablet; Refill: 3    5. Mild persistent asthma without complication  - albuterol (PROVENTIL/VENTOLIN HFA) 90 mcg/actuation inhaler; Inhale 2 puffs into the lungs every 8 (eight) hours as needed for Wheezing. (RESCUE INHALER)  Dispense: 18 g; Refill: 5  - fluticasone-salmeterol diskus inhaler 250-50 mcg; Inhale 1 puff into the lungs 2 (two) times daily. Controller  Dispense: 180 each; Refill: 3    6. Environmental and seasonal  "allergies  - fluticasone propionate (FLONASE) 50 mcg/actuation nasal spray; 2 sprays (100 mcg total) by Each Nostril route once daily.  Dispense: 48 g; Refill: 3    7. Chronic viral hepatitis B  - Hemoglobin A1C; Future  - Comprehensive Metabolic Panel; Future  - Lipid Panel; Future  - Hepatitis B Surface Antigen; Future  - Hepatitis B e Antigen; Future  - Hepatitis B Core Antibody, IgM; Future  - Hepatitis B Surface Ab, Qualitative; Future  - Hepatitis B e antibody; Future  - HEPATITIS B VIRAL DNA, QUANTITATIVE; Future    8. Need for pneumococcal vaccine  - Pneumococcal Conjugate Vaccine (20 Valent) (IM)    Orders Placed This Encounter   Procedures    Pneumococcal Conjugate Vaccine (20 Valent) (IM)    Hemoglobin A1C    Comprehensive Metabolic Panel    Lipid Panel    Hepatitis B Surface Antigen    Hepatitis B e Antigen    Hepatitis B Core Antibody, IgM    Hepatitis B Surface Ab, Qualitative    Hepatitis B e antibody    HEPATITIS B VIRAL DNA, QUANTITATIVE    Ambulatory referral/consult to [facility] Lifestyle and Wellness        Documentation entered by me for this encounter may have been done in part using speech-recognition technology. Although I have made an effort to ensure accuracy, "sound like" errors may exist and should be interpreted in context.  "

## 2023-05-02 NOTE — ASSESSMENT & PLAN NOTE
"Wt Readings from Last 6 Encounters:   05/02/23 110.4 kg (243 lb 6.2 oz)   03/24/22 116.7 kg (257 lb 4.4 oz)   03/21/22 117.8 kg (259 lb 11.2 oz)   11/12/20 110.4 kg (243 lb 6.2 oz)   10/19/19 109.6 kg (241 lb 10 oz)   05/31/18 91.2 kg (201 lb)     Estimated body mass index is 33.95 kg/m² as calculated from the following:    Height as of this encounter: 5' 11" (1.803 m).    Weight as of this encounter: 110.4 kg (243 lb 6.2 oz).    Therapeutic lifestyle changes encouraged. Recommended referral to Lifestyle & Wellness clinic. Additional education/instructions were reviewed and shared with Andrew. Refer to After Visit Summary.  "

## 2023-05-02 NOTE — ASSESSMENT & PLAN NOTE
BP Readings from Last 6 Encounters:   05/02/23 126/86   03/24/22 122/88   03/21/22 110/80   11/12/20 120/80   10/19/19 128/86   05/31/18 120/83     Lab Results   Component Value Date    CREATININE 1.0 03/24/2022    BUN 15 03/24/2022    K 4.2 03/24/2022     03/24/2022     03/24/2022     Results for orders placed or performed during the hospital encounter of 03/24/22   SCHEDULED EKG 12-LEAD (to Muse)    Collection Time: 03/24/22  4:14 PM    Narrative    Test Reason : I10    Vent. Rate : 065 BPM     Atrial Rate : 065 BPM     P-R Int : 178 ms          QRS Dur : 090 ms      QT Int : 378 ms       P-R-T Axes : 073 050 044 degrees     QTc Int : 393 ms    Sinus rhythm with marked sinus arrythmia  Otherwise normal ECG  No previous ECGs available  Confirmed by AARTI SCHROEDER, LAURA ([id number]) on 3/24/2022 6:23:37 PM    Referred By: CRISTIAN GARCES           Confirmed By:LAURA BROUSSARD MD

## 2023-05-03 ENCOUNTER — TELEPHONE (OUTPATIENT)
Dept: PRIMARY CARE CLINIC | Facility: CLINIC | Age: 43
End: 2023-05-03
Payer: COMMERCIAL

## 2023-05-03 LAB
CHOLEST SERPL-MCNC: 251 MG/DL (ref 120–199)
CHOLEST/HDLC SERPL: 4.6 {RATIO} (ref 2–5)
ESTIMATED AVG GLUCOSE: 82 MG/DL (ref 68–131)
HBA1C MFR BLD: 4.5 % (ref 4–5.6)
HBV CORE IGM SERPL QL IA: NORMAL
HDLC SERPL-MCNC: 54 MG/DL (ref 40–75)
HDLC SERPL: 21.5 % (ref 20–50)
LDLC SERPL CALC-MCNC: 180.2 MG/DL (ref 63–159)
NONHDLC SERPL-MCNC: 197 MG/DL
TRIGL SERPL-MCNC: 84 MG/DL (ref 30–150)

## 2023-05-04 LAB
ALBUMIN SERPL BCP-MCNC: 3.9 G/DL (ref 3.5–5.2)
ALP SERPL-CCNC: 54 U/L (ref 55–135)
ALT SERPL W/O P-5'-P-CCNC: 16 U/L (ref 10–44)
ANION GAP SERPL CALC-SCNC: 7 MMOL/L (ref 8–16)
AST SERPL-CCNC: 16 U/L (ref 10–40)
BILIRUB SERPL-MCNC: 0.7 MG/DL (ref 0.1–1)
BUN SERPL-MCNC: 13 MG/DL (ref 6–20)
CALCIUM SERPL-MCNC: 9.7 MG/DL (ref 8.7–10.5)
CHLORIDE SERPL-SCNC: 103 MMOL/L (ref 95–110)
CO2 SERPL-SCNC: 27 MMOL/L (ref 23–29)
CREAT SERPL-MCNC: 1 MG/DL (ref 0.5–1.4)
EST. GFR  (NO RACE VARIABLE): >60 ML/MIN/1.73 M^2
GLUCOSE SERPL-MCNC: 77 MG/DL (ref 70–110)
HBSAG CONFIRMATION: ABNORMAL
HBV SURFACE AB SER-ACNC: <3 MIU/ML
HBV SURFACE AB SER-ACNC: NORMAL M[IU]/ML
HBV SURFACE AG SERPL QL IA: REACTIVE
POTASSIUM SERPL-SCNC: 4.4 MMOL/L (ref 3.5–5.1)
PROT SERPL-MCNC: 7.7 G/DL (ref 6–8.4)
SODIUM SERPL-SCNC: 137 MMOL/L (ref 136–145)

## 2023-05-06 LAB
HBV E AB SER QL: REACTIVE
HBV E AG SERPL QL IA: NONREACTIVE

## 2023-05-08 LAB
HBV DNA SERPL NAA+PROBE-ACNC: ABNORMAL IU/ML
HBV DNA SERPL NAA+PROBE-LOG IU: 3.55 LOG (10) IU/ML
HBV DNA SERPL QL NAA+PROBE: DETECTED

## 2023-05-12 NOTE — PROGRESS NOTES
Please call Andrew to relay the first paragraph of the message below and schedule them a virtual visit to discuss their results.    If unsuccessful reaching him, then copy and paste the whole message below into Patient Portal message to Andrew (even if you left voicemail). Thanks.  --------------------------------------------------------------------------------  Andrew Starkey.    I've reviewed your test results and there are a few points we need to discuss. Specifically, your labs show that your hepatitis B virus infection is still active and requires treatment. I want to discuss treatment options with you and offer a consultation with one of our excellent hepatologists (liver specialists). Additionally, we need to discuss treatment options for your high cholesterol.    Please schedule a virtual video visit with me at your earliest convenience. This will allow us to review your results in detail and discuss what the next steps are. There will also be time for any questions you might have.    If you encounter any issues scheduling your appointment, send my staff a message, and they'll be glad to assist.    I look forward to our discussion.    Best wishes,    CRISTIAN Mike MD

## 2023-05-16 ENCOUNTER — PATIENT MESSAGE (OUTPATIENT)
Dept: INTERNAL MEDICINE | Facility: CLINIC | Age: 43
End: 2023-05-16
Payer: COMMERCIAL

## 2023-05-23 ENCOUNTER — PATIENT MESSAGE (OUTPATIENT)
Dept: INTERNAL MEDICINE | Facility: CLINIC | Age: 43
End: 2023-05-23

## 2023-05-23 ENCOUNTER — OFFICE VISIT (OUTPATIENT)
Dept: INTERNAL MEDICINE | Facility: CLINIC | Age: 43
End: 2023-05-23
Payer: COMMERCIAL

## 2023-05-23 DIAGNOSIS — I10 ESSENTIAL HYPERTENSION: Chronic | ICD-10-CM

## 2023-05-23 DIAGNOSIS — B18.1 CHRONIC VIRAL HEPATITIS B: Primary | Chronic | ICD-10-CM

## 2023-05-23 DIAGNOSIS — J30.89 ENVIRONMENTAL AND SEASONAL ALLERGIES: ICD-10-CM

## 2023-05-23 DIAGNOSIS — J45.30 MILD PERSISTENT ASTHMA WITHOUT COMPLICATION: Chronic | ICD-10-CM

## 2023-05-23 DIAGNOSIS — E78.00 PURE HYPERCHOLESTEROLEMIA: Chronic | ICD-10-CM

## 2023-05-23 PROCEDURE — 1159F PR MEDICATION LIST DOCUMENTED IN MEDICAL RECORD: ICD-10-PCS | Mod: CPTII,95,, | Performed by: FAMILY MEDICINE

## 2023-05-23 PROCEDURE — 4010F PR ACE/ARB THEARPY RXD/TAKEN: ICD-10-PCS | Mod: CPTII,95,, | Performed by: FAMILY MEDICINE

## 2023-05-23 PROCEDURE — 1160F PR REVIEW ALL MEDS BY PRESCRIBER/CLIN PHARMACIST DOCUMENTED: ICD-10-PCS | Mod: CPTII,95,, | Performed by: FAMILY MEDICINE

## 2023-05-23 PROCEDURE — 1160F RVW MEDS BY RX/DR IN RCRD: CPT | Mod: CPTII,95,, | Performed by: FAMILY MEDICINE

## 2023-05-23 PROCEDURE — 99214 PR OFFICE/OUTPT VISIT, EST, LEVL IV, 30-39 MIN: ICD-10-PCS | Mod: 95,,, | Performed by: FAMILY MEDICINE

## 2023-05-23 PROCEDURE — 1159F MED LIST DOCD IN RCRD: CPT | Mod: CPTII,95,, | Performed by: FAMILY MEDICINE

## 2023-05-23 PROCEDURE — 99214 OFFICE O/P EST MOD 30 MIN: CPT | Mod: 95,,, | Performed by: FAMILY MEDICINE

## 2023-05-23 PROCEDURE — 3044F PR MOST RECENT HEMOGLOBIN A1C LEVEL <7.0%: ICD-10-PCS | Mod: CPTII,95,, | Performed by: FAMILY MEDICINE

## 2023-05-23 PROCEDURE — 4010F ACE/ARB THERAPY RXD/TAKEN: CPT | Mod: CPTII,95,, | Performed by: FAMILY MEDICINE

## 2023-05-23 PROCEDURE — 3044F HG A1C LEVEL LT 7.0%: CPT | Mod: CPTII,95,, | Performed by: FAMILY MEDICINE

## 2023-05-23 RX ORDER — FLUTICASONE PROPIONATE 50 MCG
2 SPRAY, SUSPENSION (ML) NASAL DAILY
Qty: 48 G | Refills: 3 | Status: SHIPPED | OUTPATIENT
Start: 2023-05-23 | End: 2023-11-27 | Stop reason: SDUPTHER

## 2023-05-23 RX ORDER — LOSARTAN POTASSIUM 50 MG/1
50 TABLET ORAL DAILY
Qty: 90 TABLET | Refills: 3 | Status: SHIPPED | OUTPATIENT
Start: 2023-05-23 | End: 2023-11-27 | Stop reason: SDUPTHER

## 2023-05-23 RX ORDER — ALBUTEROL SULFATE 90 UG/1
2 AEROSOL, METERED RESPIRATORY (INHALATION) EVERY 8 HOURS PRN
Qty: 18 G | Refills: 5 | Status: SHIPPED | OUTPATIENT
Start: 2023-05-23 | End: 2023-11-27 | Stop reason: SDUPTHER

## 2023-05-23 RX ORDER — FLUTICASONE PROPIONATE AND SALMETEROL 250; 50 UG/1; UG/1
1 POWDER RESPIRATORY (INHALATION) 2 TIMES DAILY
Qty: 180 EACH | Refills: 3 | Status: SHIPPED | OUTPATIENT
Start: 2023-05-23 | End: 2023-11-27 | Stop reason: SDUPTHER

## 2023-05-23 NOTE — PROGRESS NOTES
TELEMEDICINE VIRTUAL VISIT  5/23/23  7:20 AM CDT    Visit Details: This visit was a telemedicine virtual visit with synchronous audio and video. Andrew represented that his location at the time of this visit was in the [facility]. Andrew chose and consented to receive these medical services by telemedicine.    Subjective   CHIEF COMPLAINT: Follow-up and Results    He was under the care of a gastroenterologist/hepatologist for treatment of viral hepatitis B. I was under the impression that this had been cured. We reviewed his recent labs showing he still has chronic active viral hepatitis. He says that his antiviral treatment was discontinued about three months ago. He wishes to transfer his hepatology care to [facility], so a referral order was entered. We reviewed his other lab results, which were remarkable for significant hypercholesterolemia. It was agreed to work on therapeutic lifestyle changes and re-evaluate in six months. His hypertension and asthma both appear well-controlled on current treatment, which he is tolerating well.      Review of Systems   Constitutional:  Negative for activity change and unexpected weight change.   HENT:  Negative for hearing loss, rhinorrhea and trouble swallowing.    Eyes:  Negative for discharge and visual disturbance.   Respiratory:  Negative for chest tightness and wheezing.    Cardiovascular:  Negative for chest pain and palpitations.   Gastrointestinal:  Negative for blood in stool, constipation, diarrhea and vomiting.   Endocrine: Negative for polydipsia and polyuria.   Genitourinary:  Negative for difficulty urinating, hematuria and urgency.   Musculoskeletal:  Negative for arthralgias, joint swelling and neck pain.   Neurological:  Negative for weakness and headaches.   Psychiatric/Behavioral:  Negative for confusion and dysphoric mood.        Objective   Physical Exam  Constitutional:       General: He is not in acute distress.     Appearance: Normal appearance.  He is not ill-appearing.   Pulmonary:      Effort: Pulmonary effort is normal. No respiratory distress.   Skin:     Coloration: Skin is not jaundiced.   Neurological:      Mental Status: He is alert. Mental status is at baseline.   Psychiatric:         Mood and Affect: Mood normal.         Behavior: Behavior normal.         Thought Content: Thought content normal.           Assessment and Plan   Unless noted herein, any chronic conditions are represented as and appear stable, and no other significant complaints or concerns were reported.    Follow up in about 6 months (around 11/23/2023) for virtual visit, review test results, discuss treatment plan, re-evaluation.      Assessment and Plan    1. Essential hypertension  - losartan (COZAAR) 50 MG tablet; Take 1 tablet (50 mg total) by mouth once daily.  Dispense: 90 tablet; Refill: 3    2. Chronic ACTIVE viral hepatitis B  - Ambulatory referral/consult to Hepatology; Future    3. Pure hypercholesterolemia  - Lipid Panel; Future    4. Mild persistent asthma without complication  - fluticasone-salmeterol diskus inhaler 250-50 mcg; Inhale 1 puff into the lungs 2 (two) times daily. Controller  Dispense: 180 each; Refill: 3  - albuterol (PROVENTIL/VENTOLIN HFA) 90 mcg/actuation inhaler; Inhale 2 puffs into the lungs every 8 (eight) hours as needed for Wheezing. (RESCUE INHALER)  Dispense: 18 g; Refill: 5    5. Environmental and seasonal allergies  - fluticasone propionate (FLONASE) 50 mcg/actuation nasal spray; 2 sprays (100 mcg total) by Each Nostril route once daily.  Dispense: 48 g; Refill: 3      Orders Placed This Encounter    Lipid Panel    Ambulatory referral/consult to Hepatology    losartan (COZAAR) 50 MG tablet    fluticasone-salmeterol diskus inhaler 250-50 mcg    albuterol (PROVENTIL/VENTOLIN HFA) 90 mcg/actuation inhaler    fluticasone propionate (FLONASE) 50 mcg/actuation nasal spray        TOTAL TIME evaluating and managing this patient for this encounter  "was greater than or equal to 20 minutes. This time was spent personally by me on the following activities: pre-charting, review of patient's past medical history, assessing age-appropriate health maintenance needs, review of any interval history, medication reconciliation, obtaining history from the patient, examination of the patient, review and interpretation of lab results, answering patient's questions about lab results, ordering referral to subspecialty provider(s), educating patient and answering their questions about treatment plan, goals of treatment, and follow-up, counseling on appropriate therapeutic lifestyle changes, and final documentation of the visit. This time was exclusive of any separately billable procedures for this patient and exclusive of time spent treating any other patients.    Documentation entered by me for this encounter may have been done in part using speech-recognition technology. Although I have made an effort to ensure accuracy, "sound like" errors may exist and should be interpreted in context.    Each patient to whom medical services are provided by telemedicine is: (1) informed of the relationship between the physician and patient and the respective role of any other health care provider with respect to management of the patient; and (2) notified that he or she may decline to receive medical services by telemedicine and may withdraw from such care at any time.  "

## 2023-05-23 NOTE — ASSESSMENT & PLAN NOTE
Lab Results   Component Value Date    CHOL 251 (H) 05/02/2023    CHOL 216 (H) 03/24/2022    TRIG 84 05/02/2023    TRIG 54 03/24/2022    HDL 54 05/02/2023    HDL 54 03/24/2022    LDLCALC 180.2 (H) 05/02/2023    LDLCALC 151.2 03/24/2022    NONHDLCHOL 197 05/02/2023    NONHDLCHOL 162 03/24/2022    AST 16 05/02/2023    ALT 16 05/02/2023     The 10-year ASCVD risk score (Karrie PICKARD, et al., 2019) is: 6.2%    Values used to calculate the score:      Age: 42 years      Sex: Male      Is Non- : Yes      Diabetic: No      Tobacco smoker: No      Systolic Blood Pressure: 126 mmHg      Is BP treated: Yes      HDL Cholesterol: 54 mg/dL      Total Cholesterol: 251 mg/dL

## 2023-05-23 NOTE — ASSESSMENT & PLAN NOTE
BP Readings from Last 6 Encounters:   05/02/23 126/86   03/24/22 122/88   03/21/22 110/80   11/12/20 120/80   10/19/19 128/86   05/31/18 120/83      Last 5 Patient Entered Readings                Current 30 Day Average:   Recent Readings        SBP (mmHg)        DBP (mmHg)        Pulse                    Lab Results   Component Value Date    EGFRNORACEVR >60.0 05/02/2023    CREATININE 1.0 05/02/2023    BUN 13 05/02/2023    K 4.4 05/02/2023     05/02/2023     05/02/2023     Results for orders placed or performed during the hospital encounter of 03/24/22   SCHEDULED EKG 12-LEAD (to Muse)    Collection Time: 03/24/22  4:14 PM    Narrative    Test Reason : I10    Vent. Rate : 065 BPM     Atrial Rate : 065 BPM     P-R Int : 178 ms          QRS Dur : 090 ms      QT Int : 378 ms       P-R-T Axes : 073 050 044 degrees     QTc Int : 393 ms    Sinus rhythm with marked sinus arrythmia  Otherwise normal ECG  No previous ECGs available  Confirmed by LAURA BROUSSARD MD ([id number]) on 3/24/2022 6:23:37 PM    Referred By: CRISTIAN GARCES           Confirmed By:LAURA BROUSSARD MD

## 2023-05-24 ENCOUNTER — TELEPHONE (OUTPATIENT)
Dept: HEPATOLOGY | Facility: CLINIC | Age: 43
End: 2023-05-24
Payer: COMMERCIAL

## 2023-05-24 NOTE — TELEPHONE ENCOUNTER
Attempted to contact patient at [phone] with no answer. Left voicemail message for patient to return call.

## 2023-05-25 ENCOUNTER — TELEPHONE (OUTPATIENT)
Dept: HEPATOLOGY | Facility: CLINIC | Age: 43
End: 2023-05-25
Payer: COMMERCIAL

## 2023-05-25 NOTE — TELEPHONE ENCOUNTER
Attempted to contact patient at [phone] with no answer. Left voicemail message for patient to return call.

## 2023-05-29 ENCOUNTER — OFFICE VISIT (OUTPATIENT)
Dept: HEPATOLOGY | Facility: CLINIC | Age: 43
End: 2023-05-29
Payer: COMMERCIAL

## 2023-05-29 VITALS
SYSTOLIC BLOOD PRESSURE: 128 MMHG | HEIGHT: 71 IN | HEART RATE: 62 BPM | BODY MASS INDEX: 34.69 KG/M2 | WEIGHT: 247.81 LBS | DIASTOLIC BLOOD PRESSURE: 84 MMHG

## 2023-05-29 DIAGNOSIS — B18.1 CHRONIC VIRAL HEPATITIS B: Chronic | ICD-10-CM

## 2023-05-29 PROCEDURE — 3044F HG A1C LEVEL LT 7.0%: CPT | Mod: CPTII,S$GLB,, | Performed by: NURSE PRACTITIONER

## 2023-05-29 PROCEDURE — 1159F MED LIST DOCD IN RCRD: CPT | Mod: CPTII,S$GLB,, | Performed by: NURSE PRACTITIONER

## 2023-05-29 PROCEDURE — 3074F PR MOST RECENT SYSTOLIC BLOOD PRESSURE < 130 MM HG: ICD-10-PCS | Mod: CPTII,S$GLB,, | Performed by: NURSE PRACTITIONER

## 2023-05-29 PROCEDURE — 3008F PR BODY MASS INDEX (BMI) DOCUMENTED: ICD-10-PCS | Mod: CPTII,S$GLB,, | Performed by: NURSE PRACTITIONER

## 2023-05-29 PROCEDURE — 3044F PR MOST RECENT HEMOGLOBIN A1C LEVEL <7.0%: ICD-10-PCS | Mod: CPTII,S$GLB,, | Performed by: NURSE PRACTITIONER

## 2023-05-29 PROCEDURE — 3079F PR MOST RECENT DIASTOLIC BLOOD PRESSURE 80-89 MM HG: ICD-10-PCS | Mod: CPTII,S$GLB,, | Performed by: NURSE PRACTITIONER

## 2023-05-29 PROCEDURE — 4010F PR ACE/ARB THEARPY RXD/TAKEN: ICD-10-PCS | Mod: CPTII,S$GLB,, | Performed by: NURSE PRACTITIONER

## 2023-05-29 PROCEDURE — 99999 PR PBB SHADOW E&M-EST. PATIENT-LVL IV: ICD-10-PCS | Mod: PBBFAC,,, | Performed by: NURSE PRACTITIONER

## 2023-05-29 PROCEDURE — 99204 OFFICE O/P NEW MOD 45 MIN: CPT | Mod: S$GLB,,, | Performed by: NURSE PRACTITIONER

## 2023-05-29 PROCEDURE — 3008F BODY MASS INDEX DOCD: CPT | Mod: CPTII,S$GLB,, | Performed by: NURSE PRACTITIONER

## 2023-05-29 PROCEDURE — 99999 PR PBB SHADOW E&M-EST. PATIENT-LVL IV: CPT | Mod: PBBFAC,,, | Performed by: NURSE PRACTITIONER

## 2023-05-29 PROCEDURE — 1159F PR MEDICATION LIST DOCUMENTED IN MEDICAL RECORD: ICD-10-PCS | Mod: CPTII,S$GLB,, | Performed by: NURSE PRACTITIONER

## 2023-05-29 PROCEDURE — 99204 PR OFFICE/OUTPT VISIT, NEW, LEVL IV, 45-59 MIN: ICD-10-PCS | Mod: S$GLB,,, | Performed by: NURSE PRACTITIONER

## 2023-05-29 PROCEDURE — 4010F ACE/ARB THERAPY RXD/TAKEN: CPT | Mod: CPTII,S$GLB,, | Performed by: NURSE PRACTITIONER

## 2023-05-29 PROCEDURE — 3074F SYST BP LT 130 MM HG: CPT | Mod: CPTII,S$GLB,, | Performed by: NURSE PRACTITIONER

## 2023-05-29 PROCEDURE — 3079F DIAST BP 80-89 MM HG: CPT | Mod: CPTII,S$GLB,, | Performed by: NURSE PRACTITIONER

## 2023-05-29 NOTE — PROGRESS NOTES
Clinic Consult:  [facility] Consultation Note    Reason for Consult:  The encounter diagnosis was Chronic ACTIVE viral hepatitis B.    PCP: ESVIN Mike   [address] [facility] / [place] [address]    HPI:  This is a 42 y.o. male here for evaluation of the above  Pt states that he was diagnosed with hepatitis B about 13 years ago.  At that time, he was being managed by Dr. Sethi.    He states that he was started on Viread due to an elevated viral count.   He continued on that for about 1 year and then his count became undetectable and the Viread was stopped.  He was monitored every 6 months and at some point, the count increased again and he was started on Vemlidy.   He states that he ran out of medication about 6 months ago and did not reach out to previous MD for refills.   He was looking to switch to [facility] for care.   He was seen by PCP recently with labs that showed active Hepatitis B.  LFTs are normal  No other known liver disease  He does have risk factors for NAFLD, including HTN, HLD, and obesity  Per care everywhere, he had a liver biopsy in 2017 without significant fibrosis       Review of Systems   Constitutional:  Negative for chills, fever, malaise/fatigue and weight loss.   Respiratory:  Negative for cough.    Cardiovascular:  Negative for chest pain.   Gastrointestinal:         Per HPI   Musculoskeletal:  Negative for myalgias.   Skin:  Negative for itching and rash.   Neurological:  Negative for headaches.   Psychiatric/Behavioral:  The patient is not nervous/anxious.      Medical History:   Past Medical History:   Diagnosis Date    Asthma     Childhood    Chronic viral hepatitis B 10/19/2019    Class 2 obesity due to excess calories with serious comorbidity and body mass index (BMI) of 36.0 to 36.9 (severe obesity) 3/21/2022    Essential hypertension 10/19/2019    Hepatitis B 2015    Hyperlipidemia 2011    Hypertension     Mild intermittent asthma without complication  "10/19/2019    Mild persistent asthma without complication 10/19/2019       Surgical History:  Past Surgical History:   Procedure Laterality Date    KNEE ARTHROSCOPY Right 1995    LIVER BIOPSY  2017       Family History:   Family History   Problem Relation Age of Onset    Hypertension Mother     Heart disease Father     Diabetes Father     Hypertension Father     Diabetes Maternal Grandmother     Heart failure Maternal Grandmother        Social History:   Social History     Tobacco Use    Smoking status: Never    Smokeless tobacco: Never   Substance Use Topics    Alcohol use: No       Allergies: Reviewed    Home Medications:   Current Outpatient Medications on File Prior to Visit   Medication Sig Dispense Refill    albuterol (PROVENTIL/VENTOLIN HFA) 90 mcg/actuation inhaler Inhale 2 puffs into the lungs every 8 (eight) hours as needed for Wheezing. (RESCUE INHALER) 18 g 5    fluticasone propionate (FLONASE) 50 mcg/actuation nasal spray 2 sprays (100 mcg total) by Each Nostril route once daily. 48 g 3    fluticasone-salmeterol diskus inhaler 250-50 mcg Inhale 1 puff into the lungs 2 (two) times daily. Controller 180 each 3    losartan (COZAAR) 50 MG tablet Take 1 tablet (50 mg total) by mouth once daily. 90 tablet 3    multivitamin (THERAGRAN) per tablet Take 1 tablet by mouth once daily.       No current facility-administered medications on file prior to visit.       Physical Exam:  Vital Signs:  /84 (BP Location: Left arm, Patient Position: Sitting, BP Method: Large (Manual))   Pulse 62   Ht 5' 11" (1.803 m)   Wt 112.4 kg (247 lb 12.8 oz)   BMI 34.56 kg/m²   Body mass index is 34.56 kg/m².  Physical Exam  Vitals reviewed.   Constitutional:       Appearance: He is well-developed. He is obese.   HENT:      Head: Normocephalic.   Eyes:      General: No scleral icterus.  Cardiovascular:      Rate and Rhythm: Normal rate.   Pulmonary:      Effort: Pulmonary effort is normal.   Abdominal:      General: There is " no distension.      Palpations: Abdomen is soft.   Musculoskeletal:         General: Normal range of motion.      Cervical back: Normal range of motion.   Skin:     General: Skin is dry.   Neurological:      Mental Status: He is alert and oriented to person, place, and time.       Labs: Pertinent labs reviewed.      Assessment:  1. Chronic ACTIVE viral hepatitis B         Recommendations:  Will plan for updated imaging and staging with an US and Fibroscan  - if there is evidence of fibrosis, will plan to restart treatment with either Viread or Vemlidy  - given the LFTs are normal and the hep B viral count is less than 20k, can monitor for now  - will need continued labs every 6 months with yearly imaging.       6 months with pre-clinic labs and imaging.           Thank you so much for allowing me to participate in the care of MEL Cai

## 2023-06-09 ENCOUNTER — TELEPHONE (OUTPATIENT)
Dept: HEPATOLOGY | Facility: CLINIC | Age: 43
End: 2023-06-09
Payer: COMMERCIAL

## 2023-06-09 NOTE — TELEPHONE ENCOUNTER
Spoke with patient and confirmed scheduled fibroscan appointment. Patient denies having a pacemaker, defibrillator, or any implantable metal devices. Patient was informed to be NPO (fasting) for 4 hours prior to procedure time. Patient aware this includes any medication or water. Patient verbalized understanding of instructions and has agreed to follow. Patient advised to contact office with any questions or concerns.    CHANELLE AvilezN, RN  Registered Nurse Navigator- Hepatology  [facility] [place]

## 2023-06-12 ENCOUNTER — PROCEDURE VISIT (OUTPATIENT)
Dept: HEPATOLOGY | Facility: CLINIC | Age: 43
End: 2023-06-12
Attending: NURSE PRACTITIONER
Payer: COMMERCIAL

## 2023-06-12 ENCOUNTER — HOSPITAL ENCOUNTER (OUTPATIENT)
Dept: RADIOLOGY | Facility: HOSPITAL | Age: 43
Discharge: HOME | End: 2023-06-12
Attending: NURSE PRACTITIONER
Payer: COMMERCIAL

## 2023-06-12 VITALS — BODY MASS INDEX: 34.54 KG/M2 | WEIGHT: 246.69 LBS | HEIGHT: 71 IN

## 2023-06-12 DIAGNOSIS — B18.1 CHRONIC VIRAL HEPATITIS B: Chronic | ICD-10-CM

## 2023-06-12 PROCEDURE — 76981 USE PARENCHYMA: CPT | Mod: S$GLB,,, | Performed by: NURSE PRACTITIONER

## 2023-06-12 PROCEDURE — 76705 ECHO EXAM OF ABDOMEN: CPT | Mod: TC

## 2023-06-12 PROCEDURE — 76705 US ABDOMEN LIMITED: ICD-10-PCS | Mod: 26,,, | Performed by: STUDENT IN AN ORGANIZED HEALTH CARE EDUCATION/TRAINING PROGRAM

## 2023-06-12 PROCEDURE — 76981 PR US, ELASTOGRAPHY, PARENCHYMA: ICD-10-PCS | Mod: S$GLB,,, | Performed by: NURSE PRACTITIONER

## 2023-06-12 PROCEDURE — 76705 ECHO EXAM OF ABDOMEN: CPT | Mod: 26,,, | Performed by: STUDENT IN AN ORGANIZED HEALTH CARE EDUCATION/TRAINING PROGRAM

## 2023-06-12 NOTE — PROCEDURES
Procedures  Fibroscan Procedure     Name: Andrew Allen  Date of Procedure : 2023   :: Gail Kennedy NP  Diagnosis: HBV    Probe: XL    Fibroscan readin.9 KPa    Fibrosis:F 0-1     CAP readin dB/m    Steatosis: :S2       Interpretation:   No fibrosis with moderate steatosis

## 2023-07-07 ENCOUNTER — PATIENT MESSAGE (OUTPATIENT)
Dept: INFECTIOUS DISEASES | Facility: CLINIC | Age: 43
End: 2023-07-07
Payer: COMMERCIAL

## 2023-11-22 ENCOUNTER — LAB VISIT (OUTPATIENT)
Dept: LAB | Facility: HOSPITAL | Age: 43
End: 2023-11-22
Attending: NURSE PRACTITIONER
Payer: COMMERCIAL

## 2023-11-22 ENCOUNTER — OFFICE VISIT (OUTPATIENT)
Dept: HEPATOLOGY | Facility: CLINIC | Age: 43
End: 2023-11-22
Payer: COMMERCIAL

## 2023-11-22 VITALS
DIASTOLIC BLOOD PRESSURE: 89 MMHG | HEIGHT: 71 IN | WEIGHT: 224 LBS | SYSTOLIC BLOOD PRESSURE: 137 MMHG | BODY MASS INDEX: 31.36 KG/M2 | HEART RATE: 72 BPM

## 2023-11-22 DIAGNOSIS — B18.1 CHRONIC VIRAL HEPATITIS B: ICD-10-CM

## 2023-11-22 DIAGNOSIS — B18.1 CHRONIC VIRAL HEPATITIS B: Primary | ICD-10-CM

## 2023-11-22 LAB
ALBUMIN SERPL BCP-MCNC: 3.8 G/DL (ref 3.5–5.2)
ALP SERPL-CCNC: 52 U/L (ref 55–135)
ALT SERPL W/O P-5'-P-CCNC: 14 U/L (ref 10–44)
ANION GAP SERPL CALC-SCNC: 9 MMOL/L (ref 8–16)
AST SERPL-CCNC: 19 U/L (ref 10–40)
BASOPHILS # BLD AUTO: 0.03 K/UL (ref 0–0.2)
BASOPHILS NFR BLD: 0.6 % (ref 0–1.9)
BILIRUB DIRECT SERPL-MCNC: 0.3 MG/DL (ref 0.1–0.3)
BILIRUB SERPL-MCNC: 0.9 MG/DL (ref 0.1–1)
BUN SERPL-MCNC: 12 MG/DL (ref 6–20)
CALCIUM SERPL-MCNC: 9.1 MG/DL (ref 8.7–10.5)
CHLORIDE SERPL-SCNC: 105 MMOL/L (ref 95–110)
CO2 SERPL-SCNC: 27 MMOL/L (ref 23–29)
CREAT SERPL-MCNC: 1 MG/DL (ref 0.5–1.4)
DIFFERENTIAL METHOD: NORMAL
EOSINOPHIL # BLD AUTO: 0.4 K/UL (ref 0–0.5)
EOSINOPHIL NFR BLD: 7 % (ref 0–8)
ERYTHROCYTE [DISTWIDTH] IN BLOOD BY AUTOMATED COUNT: 13.2 % (ref 11.5–14.5)
EST. GFR  (NO RACE VARIABLE): >60 ML/MIN/1.73 M^2
GLUCOSE SERPL-MCNC: 79 MG/DL (ref 70–110)
HCT VFR BLD AUTO: 44.8 % (ref 40–54)
HGB BLD-MCNC: 14.5 G/DL (ref 14–18)
IMM GRANULOCYTES # BLD AUTO: 0.01 K/UL (ref 0–0.04)
IMM GRANULOCYTES NFR BLD AUTO: 0.2 % (ref 0–0.5)
INR PPP: 1.1 (ref 0.8–1.2)
LYMPHOCYTES # BLD AUTO: 1.5 K/UL (ref 1–4.8)
LYMPHOCYTES NFR BLD: 30.1 % (ref 18–48)
MCH RBC QN AUTO: 29 PG (ref 27–31)
MCHC RBC AUTO-ENTMCNC: 32.4 G/DL (ref 32–36)
MCV RBC AUTO: 90 FL (ref 82–98)
MONOCYTES # BLD AUTO: 0.4 K/UL (ref 0.3–1)
MONOCYTES NFR BLD: 7.8 % (ref 4–15)
NEUTROPHILS # BLD AUTO: 2.7 K/UL (ref 1.8–7.7)
NEUTROPHILS NFR BLD: 54.3 % (ref 38–73)
NRBC BLD-RTO: 0 /100 WBC
PLATELET # BLD AUTO: 238 K/UL (ref 150–450)
PMV BLD AUTO: 10.6 FL (ref 9.2–12.9)
POTASSIUM SERPL-SCNC: 4.2 MMOL/L (ref 3.5–5.1)
PROT SERPL-MCNC: 7.3 G/DL (ref 6–8.4)
PROTHROMBIN TIME: 11.8 SEC (ref 9–12.5)
RBC # BLD AUTO: 5 M/UL (ref 4.6–6.2)
SODIUM SERPL-SCNC: 141 MMOL/L (ref 136–145)
WBC # BLD AUTO: 5.01 K/UL (ref 3.9–12.7)

## 2023-11-22 PROCEDURE — 3075F SYST BP GE 130 - 139MM HG: CPT | Mod: CPTII,S$GLB,, | Performed by: NURSE PRACTITIONER

## 2023-11-22 PROCEDURE — 1159F PR MEDICATION LIST DOCUMENTED IN MEDICAL RECORD: ICD-10-PCS | Mod: CPTII,S$GLB,, | Performed by: NURSE PRACTITIONER

## 2023-11-22 PROCEDURE — 85025 COMPLETE CBC W/AUTO DIFF WBC: CPT | Performed by: NURSE PRACTITIONER

## 2023-11-22 PROCEDURE — 87517 HEPATITIS B DNA QUANT: CPT | Performed by: NURSE PRACTITIONER

## 2023-11-22 PROCEDURE — 3008F PR BODY MASS INDEX (BMI) DOCUMENTED: ICD-10-PCS | Mod: CPTII,S$GLB,, | Performed by: NURSE PRACTITIONER

## 2023-11-22 PROCEDURE — 4010F PR ACE/ARB THEARPY RXD/TAKEN: ICD-10-PCS | Mod: CPTII,S$GLB,, | Performed by: NURSE PRACTITIONER

## 2023-11-22 PROCEDURE — 99214 OFFICE O/P EST MOD 30 MIN: CPT | Mod: S$GLB,,, | Performed by: NURSE PRACTITIONER

## 2023-11-22 PROCEDURE — 99999 PR PBB SHADOW E&M-EST. PATIENT-LVL III: ICD-10-PCS | Mod: PBBFAC,,, | Performed by: NURSE PRACTITIONER

## 2023-11-22 PROCEDURE — 80048 BASIC METABOLIC PNL TOTAL CA: CPT | Performed by: NURSE PRACTITIONER

## 2023-11-22 PROCEDURE — 3008F BODY MASS INDEX DOCD: CPT | Mod: CPTII,S$GLB,, | Performed by: NURSE PRACTITIONER

## 2023-11-22 PROCEDURE — 4010F ACE/ARB THERAPY RXD/TAKEN: CPT | Mod: CPTII,S$GLB,, | Performed by: NURSE PRACTITIONER

## 2023-11-22 PROCEDURE — 3079F DIAST BP 80-89 MM HG: CPT | Mod: CPTII,S$GLB,, | Performed by: NURSE PRACTITIONER

## 2023-11-22 PROCEDURE — 80076 HEPATIC FUNCTION PANEL: CPT | Performed by: NURSE PRACTITIONER

## 2023-11-22 PROCEDURE — 3044F HG A1C LEVEL LT 7.0%: CPT | Mod: CPTII,S$GLB,, | Performed by: NURSE PRACTITIONER

## 2023-11-22 PROCEDURE — 3075F PR MOST RECENT SYSTOLIC BLOOD PRESS GE 130-139MM HG: ICD-10-PCS | Mod: CPTII,S$GLB,, | Performed by: NURSE PRACTITIONER

## 2023-11-22 PROCEDURE — 36415 COLL VENOUS BLD VENIPUNCTURE: CPT | Performed by: NURSE PRACTITIONER

## 2023-11-22 PROCEDURE — 99214 PR OFFICE/OUTPT VISIT, EST, LEVL IV, 30-39 MIN: ICD-10-PCS | Mod: S$GLB,,, | Performed by: NURSE PRACTITIONER

## 2023-11-22 PROCEDURE — 85610 PROTHROMBIN TIME: CPT | Performed by: NURSE PRACTITIONER

## 2023-11-22 PROCEDURE — 3044F PR MOST RECENT HEMOGLOBIN A1C LEVEL <7.0%: ICD-10-PCS | Mod: CPTII,S$GLB,, | Performed by: NURSE PRACTITIONER

## 2023-11-22 PROCEDURE — 3079F PR MOST RECENT DIASTOLIC BLOOD PRESSURE 80-89 MM HG: ICD-10-PCS | Mod: CPTII,S$GLB,, | Performed by: NURSE PRACTITIONER

## 2023-11-22 PROCEDURE — 1159F MED LIST DOCD IN RCRD: CPT | Mod: CPTII,S$GLB,, | Performed by: NURSE PRACTITIONER

## 2023-11-22 PROCEDURE — 99999 PR PBB SHADOW E&M-EST. PATIENT-LVL III: CPT | Mod: PBBFAC,,, | Performed by: NURSE PRACTITIONER

## 2023-11-22 NOTE — PROGRESS NOTES
Clinic Follow Up:  [facility] Follow Up Note    Reason for Follow Up:  The encounter diagnosis was Chronic viral hepatitis B.    PCP: ESVIN Mike       HPI:  This is a 43 y.o. male here for follow up of the above  Pt states that he has been feeling overall well without any new complaints.   Due for labs for monitoring  Previous Fibroscan without evidence of fibrosis.   Denies any abdominal pain.  No nausea or vomiting.  No change in bowel pattern.  No melena or hematochezia. No weight loss.  No upper GI bleeding.  No ascites or BLE.  No overt confusion.      Review of Systems   Constitutional:  Negative for chills, fever, malaise/fatigue and weight loss.   Respiratory:  Negative for cough.    Cardiovascular:  Negative for chest pain.   Gastrointestinal:         Per HPI   Musculoskeletal:  Negative for myalgias.   Skin:  Negative for itching and rash.   Neurological:  Negative for headaches.   Psychiatric/Behavioral:  The patient is not nervous/anxious.        Medical History:  Past Medical History:   Diagnosis Date    Asthma     Childhood    Chronic viral hepatitis B 10/19/2019    Class 2 obesity due to excess calories with serious comorbidity and body mass index (BMI) of 36.0 to 36.9 (severe obesity) 3/21/2022    Essential hypertension 10/19/2019    Hepatitis B 2015    Hyperlipidemia 2011    Hypertension     Mild intermittent asthma without complication 10/19/2019    Mild persistent asthma without complication 10/19/2019       Surgical History:   Past Surgical History:   Procedure Laterality Date    KNEE ARTHROSCOPY Right 1995    LIVER BIOPSY  2017       Family History:   Family History   Problem Relation Age of Onset    Hypertension Mother     Heart disease Father     Diabetes Father     Hypertension Father     Diabetes Maternal Grandmother     Heart failure Maternal Grandmother        Social History:   Social History     Tobacco Use    Smoking status: Never    Smokeless tobacco: Never  "  Substance Use Topics    Alcohol use: No       Allergies: Reviewed    Home Medications:  Current Outpatient Medications on File Prior to Visit   Medication Sig Dispense Refill    albuterol (PROVENTIL/VENTOLIN HFA) 90 mcg/actuation inhaler Inhale 2 puffs into the lungs every 8 (eight) hours as needed for Wheezing. (RESCUE INHALER) 18 g 5    fluticasone propionate (FLONASE) 50 mcg/actuation nasal spray 2 sprays (100 mcg total) by Each Nostril route once daily. 48 g 3    fluticasone-salmeterol diskus inhaler 250-50 mcg Inhale 1 puff into the lungs 2 (two) times daily. Controller 180 each 3    losartan (COZAAR) 50 MG tablet Take 1 tablet (50 mg total) by mouth once daily. 90 tablet 3    multivitamin (THERAGRAN) per tablet Take 1 tablet by mouth once daily.       No current facility-administered medications on file prior to visit.       Physical Exam:  Vital Signs:  /89 (BP Location: Right arm, Patient Position: Sitting, BP Method: Medium (Automatic))   Pulse 72   Ht 5' 11" (1.803 m)   Wt 101.6 kg (223 lb 15.8 oz)   BMI 31.24 kg/m²   Body mass index is 31.24 kg/m².  Physical Exam  Vitals reviewed.   Constitutional:       Appearance: He is well-developed.   HENT:      Head: Normocephalic.   Cardiovascular:      Rate and Rhythm: Normal rate.   Pulmonary:      Effort: Pulmonary effort is normal.   Abdominal:      General: There is no distension.      Tenderness: There is no abdominal tenderness.   Musculoskeletal:         General: Normal range of motion.      Cervical back: Normal range of motion.   Skin:     General: Skin is warm and dry.   Neurological:      Mental Status: He is alert and oriented to person, place, and time.         Labs: Pertinent labs reviewed.      Assessment:  1. Chronic viral hepatitis B        Recommendations:  Stable without new complaints  Will get updated labs today.  If stable, can hold off on medications for now.   If stable, will continue monitoring with labs every 6 months and US " yearly  Repeat Fibroscan every 2-3 years       Return to Clinic:  6 months with pre-clinic labs and imaging.

## 2023-11-24 ENCOUNTER — LAB VISIT (OUTPATIENT)
Dept: LAB | Facility: HOSPITAL | Age: 43
End: 2023-11-24
Attending: FAMILY MEDICINE
Payer: COMMERCIAL

## 2023-11-24 DIAGNOSIS — E78.00 PURE HYPERCHOLESTEROLEMIA: Chronic | ICD-10-CM

## 2023-11-24 LAB
CHOLEST SERPL-MCNC: 225 MG/DL (ref 120–199)
CHOLEST/HDLC SERPL: 4 {RATIO} (ref 2–5)
HDLC SERPL-MCNC: 56 MG/DL (ref 40–75)
HDLC SERPL: 24.9 % (ref 20–50)
HEPATITIS B VIRUS DNA: ABNORMAL
HEPATITIS B VIRUS LOG (IU/ML): 3.4 LOGIU/ML
HEPATITIS B VIRUS PCR, QUANT: 2494 IU/ML
LDLC SERPL CALC-MCNC: 158.4 MG/DL (ref 63–159)
NONHDLC SERPL-MCNC: 169 MG/DL
TRIGL SERPL-MCNC: 53 MG/DL (ref 30–150)

## 2023-11-24 PROCEDURE — 80061 LIPID PANEL: CPT | Performed by: FAMILY MEDICINE

## 2023-11-24 PROCEDURE — 36415 COLL VENOUS BLD VENIPUNCTURE: CPT | Performed by: FAMILY MEDICINE

## 2023-11-27 ENCOUNTER — OFFICE VISIT (OUTPATIENT)
Dept: INTERNAL MEDICINE | Facility: CLINIC | Age: 43
End: 2023-11-27
Payer: COMMERCIAL

## 2023-11-27 VITALS — SYSTOLIC BLOOD PRESSURE: 137 MMHG | DIASTOLIC BLOOD PRESSURE: 89 MMHG

## 2023-11-27 DIAGNOSIS — I10 ESSENTIAL HYPERTENSION: Chronic | ICD-10-CM

## 2023-11-27 DIAGNOSIS — E78.2 MODERATE MIXED HYPERLIPIDEMIA NOT REQUIRING STATIN THERAPY: Primary | Chronic | ICD-10-CM

## 2023-11-27 DIAGNOSIS — J30.89 ENVIRONMENTAL AND SEASONAL ALLERGIES: ICD-10-CM

## 2023-11-27 DIAGNOSIS — B18.1 CHRONIC VIRAL HEPATITIS B: Primary | ICD-10-CM

## 2023-11-27 DIAGNOSIS — J45.30 MILD PERSISTENT ASTHMA WITHOUT COMPLICATION: Chronic | ICD-10-CM

## 2023-11-27 DIAGNOSIS — E66.09 CLASS 1 OBESITY DUE TO EXCESS CALORIES WITH SERIOUS COMORBIDITY IN ADULT, UNSPECIFIED BMI: ICD-10-CM

## 2023-11-27 PROCEDURE — 1160F RVW MEDS BY RX/DR IN RCRD: CPT | Mod: CPTII,95,, | Performed by: FAMILY MEDICINE

## 2023-11-27 PROCEDURE — 3079F DIAST BP 80-89 MM HG: CPT | Mod: CPTII,95,, | Performed by: FAMILY MEDICINE

## 2023-11-27 PROCEDURE — 99214 PR OFFICE/OUTPT VISIT, EST, LEVL IV, 30-39 MIN: ICD-10-PCS | Mod: 95,,, | Performed by: FAMILY MEDICINE

## 2023-11-27 PROCEDURE — 3044F PR MOST RECENT HEMOGLOBIN A1C LEVEL <7.0%: ICD-10-PCS | Mod: CPTII,95,, | Performed by: FAMILY MEDICINE

## 2023-11-27 PROCEDURE — 3075F PR MOST RECENT SYSTOLIC BLOOD PRESS GE 130-139MM HG: ICD-10-PCS | Mod: CPTII,95,, | Performed by: FAMILY MEDICINE

## 2023-11-27 PROCEDURE — 3075F SYST BP GE 130 - 139MM HG: CPT | Mod: CPTII,95,, | Performed by: FAMILY MEDICINE

## 2023-11-27 PROCEDURE — 1160F PR REVIEW ALL MEDS BY PRESCRIBER/CLIN PHARMACIST DOCUMENTED: ICD-10-PCS | Mod: CPTII,95,, | Performed by: FAMILY MEDICINE

## 2023-11-27 PROCEDURE — 1159F PR MEDICATION LIST DOCUMENTED IN MEDICAL RECORD: ICD-10-PCS | Mod: CPTII,95,, | Performed by: FAMILY MEDICINE

## 2023-11-27 PROCEDURE — 99214 OFFICE O/P EST MOD 30 MIN: CPT | Mod: 95,,, | Performed by: FAMILY MEDICINE

## 2023-11-27 PROCEDURE — 1159F MED LIST DOCD IN RCRD: CPT | Mod: CPTII,95,, | Performed by: FAMILY MEDICINE

## 2023-11-27 PROCEDURE — 3044F HG A1C LEVEL LT 7.0%: CPT | Mod: CPTII,95,, | Performed by: FAMILY MEDICINE

## 2023-11-27 PROCEDURE — 4010F ACE/ARB THERAPY RXD/TAKEN: CPT | Mod: CPTII,95,, | Performed by: FAMILY MEDICINE

## 2023-11-27 PROCEDURE — 4010F PR ACE/ARB THEARPY RXD/TAKEN: ICD-10-PCS | Mod: CPTII,95,, | Performed by: FAMILY MEDICINE

## 2023-11-27 PROCEDURE — 3079F PR MOST RECENT DIASTOLIC BLOOD PRESSURE 80-89 MM HG: ICD-10-PCS | Mod: CPTII,95,, | Performed by: FAMILY MEDICINE

## 2023-11-27 RX ORDER — ALBUTEROL SULFATE 90 UG/1
2 AEROSOL, METERED RESPIRATORY (INHALATION) EVERY 8 HOURS PRN
Qty: 18 G | Refills: 5 | Status: SHIPPED | OUTPATIENT
Start: 2023-11-27

## 2023-11-27 RX ORDER — LOSARTAN POTASSIUM 50 MG/1
50 TABLET ORAL DAILY
Qty: 90 TABLET | Refills: 2 | Status: SHIPPED | OUTPATIENT
Start: 2023-11-27 | End: 2026-08-22

## 2023-11-27 RX ORDER — FLUTICASONE PROPIONATE AND SALMETEROL 250; 50 UG/1; UG/1
1 POWDER RESPIRATORY (INHALATION) 2 TIMES DAILY
Qty: 180 EACH | Refills: 2 | Status: SHIPPED | OUTPATIENT
Start: 2023-11-27 | End: 2024-11-26

## 2023-11-27 RX ORDER — FLUTICASONE PROPIONATE 50 MCG
2 SPRAY, SUSPENSION (ML) NASAL DAILY
Qty: 48 G | Refills: 2 | Status: SHIPPED | OUTPATIENT
Start: 2023-11-27

## 2023-11-27 NOTE — PROGRESS NOTES
TELEMEDICINE VIRTUAL VIDEO VISIT  11/27/23  7:20 AM CST    Visit Type: Audiovisual    Patient's Location: Andrew represents that they are located within the state [facility].    CHIEF COMPLAINT:  Follow-up, Results    He reports that he has made significant therapeutic lifestyle changes and has lost at least 20 pounds since his last appointment with me. He is exercising regularly and eating healthier. We reviewed his lipid panel, showing significant interval improvement. It was agreed to recheck the lipid panel just before his annual wellness visit in approximately six months and reevaluate then. In the meantime, he will continue to work on therapeutic lifestyle changes.    Hypertension is well controlled.    Allergies and asthma are well-controlled on controller medication.        1. Moderate mixed hyperlipidemia not requiring statin therapy  -     LIPID PANEL; Future; Expected date: 05/28/2024    2. Essential hypertension  -     losartan (COZAAR) 50 MG tablet; Take 1 tablet (50 mg total) by mouth once daily.  Dispense: 90 tablet; Refill: 2    3. Mild persistent asthma without complication  -     albuterol (PROVENTIL/VENTOLIN HFA) 90 mcg/actuation inhaler; Inhale 2 puffs into the lungs every 8 (eight) hours as needed for Wheezing. (RESCUE INHALER)  Dispense: 18 g; Refill: 5  -     fluticasone-salmeterol diskus inhaler 250-50 mcg; Inhale 1 puff into the lungs 2 (two) times daily. Controller  Dispense: 180 each; Refill: 2    4. Environmental and seasonal allergies  -     fluticasone propionate (FLONASE) 50 mcg/actuation nasal spray; 2 sprays (100 mcg total) by Each Nostril route once daily.  Dispense: 48 g; Refill: 2    5. Class 1 obesity due to excess calories with serious comorbidity in adult, unspecified BMI    Unless noted herein, any chronic conditions are represented as and appear stable, and no other significant complaints or concerns were reported.    Review of Systems   Constitutional:  Negative for  "activity change and unexpected weight change.   HENT:  Negative for hearing loss, rhinorrhea and trouble swallowing.    Eyes:  Negative for discharge and visual disturbance.   Respiratory:  Negative for chest tightness and wheezing.    Cardiovascular:  Negative for chest pain and palpitations.   Gastrointestinal:  Negative for blood in stool, constipation, diarrhea and vomiting.   Endocrine: Negative for polydipsia and polyuria.   Genitourinary:  Negative for difficulty urinating, hematuria and urgency.   Musculoskeletal:  Negative for arthralgias, joint swelling and neck pain.   Neurological:  Negative for weakness and headaches.   Psychiatric/Behavioral:  Negative for confusion and dysphoric mood.        Physical Exam  Constitutional:       General: He is not in acute distress.     Appearance: Normal appearance. He is not ill-appearing.   Pulmonary:      Effort: Pulmonary effort is normal. No respiratory distress.   Skin:     Coloration: Skin is not jaundiced.   Neurological:      Mental Status: He is alert. Mental status is at baseline.   Psychiatric:         Mood and Affect: Mood normal.         Behavior: Behavior normal.         Thought Content: Thought content normal.       Follow up in about 6 months (around 5/27/2024).   Change 5/28/24 lab appointment to fasting.  Link Lipid Panel to 5/28/24 fasting lab appointment.  Schedule annual wellness visit with me early morning a few days later (such as May 30).  Future Appointments   Date Time Provider Department Center   5/28/2024  8:[address] [facility]   5/28/2024  9:35 AM LABORATORY, [facility] LAB [facility]   5/28/2024 10:00 AM Jessie Salas, [facility] [place]       Documentation entered by me for this encounter may have been done in part using speech-recognition technology. Although I have made an effort to ensure accuracy, "sound like" errors may exist and should be interpreted in context.   TOTAL TIME evaluating and managing this " "patient for this encounter was greater than or equal to 19 minutes.  This time was exclusive of any separately billable procedures for this patient and exclusive of time spent treating any other patient.    Documentation entered by me for this encounter may have been done in part using speech-recognition technology. Although I have made an effort to ensure accuracy, "sound like" errors may exist and should be interpreted in context.    Each patient to whom medical services are provided by telemedicine is: (1) informed of the relationship between the physician and patient and the respective role of any other health care provider with respect to management of the patient; and (2) notified that he or she may decline to receive medical services by telemedicine and may withdraw from such care at any time.  "

## 2023-11-27 NOTE — Clinical Note
Change 5/28/24 lab appointment to fasting. Link Lipid Panel to 5/28/24 fasting lab appointment. Schedule annual wellness visit with me early morning a few days later (such as May 30).

## 2024-04-30 DIAGNOSIS — J45.30 MILD PERSISTENT ASTHMA WITHOUT COMPLICATION: Chronic | ICD-10-CM

## 2024-04-30 NOTE — TELEPHONE ENCOUNTER
No care due was identified.  [facility] Embedded Care Due Messages. Reference number: [id number].   4/30/2024 12:25:19 PM CDT

## 2024-05-01 RX ORDER — ALBUTEROL SULFATE 90 UG/1
2 AEROSOL, METERED RESPIRATORY (INHALATION) EVERY 8 HOURS PRN
Qty: 54 G | Refills: 2 | Status: SHIPPED | OUTPATIENT
Start: 2024-05-01 | End: 2024-05-27 | Stop reason: SDUPTHER

## 2024-05-01 NOTE — TELEPHONE ENCOUNTER
Refill Decision Note   Andrew Allen  is requesting a refill authorization.  Brief Assessment and Rationale for Refill:  Approve     Medication Therapy Plan:         Comments:     Note composed:12:13 PM 05/01/2024             Appointments     Last Visit   11/27/2023 ESVIN Mike MD   Next Visit   5/27/2024 ESVIN Mike MD

## 2024-05-27 ENCOUNTER — OFFICE VISIT (OUTPATIENT)
Dept: INTERNAL MEDICINE | Facility: CLINIC | Age: 44
End: 2024-05-27
Payer: COMMERCIAL

## 2024-05-27 ENCOUNTER — LAB VISIT (OUTPATIENT)
Dept: LAB | Facility: HOSPITAL | Age: 44
End: 2024-05-27
Attending: NURSE PRACTITIONER
Payer: COMMERCIAL

## 2024-05-27 VITALS
HEART RATE: 108 BPM | DIASTOLIC BLOOD PRESSURE: 88 MMHG | WEIGHT: 220.69 LBS | RESPIRATION RATE: 18 BRPM | TEMPERATURE: 97 F | OXYGEN SATURATION: 96 % | SYSTOLIC BLOOD PRESSURE: 120 MMHG | HEIGHT: 71 IN | BODY MASS INDEX: 30.9 KG/M2

## 2024-05-27 DIAGNOSIS — B18.1 CHRONIC VIRAL HEPATITIS B: Chronic | ICD-10-CM

## 2024-05-27 DIAGNOSIS — E78.2 MODERATE MIXED HYPERLIPIDEMIA NOT REQUIRING STATIN THERAPY: Chronic | ICD-10-CM

## 2024-05-27 DIAGNOSIS — B18.1 CHRONIC VIRAL HEPATITIS B: ICD-10-CM

## 2024-05-27 DIAGNOSIS — J30.89 ENVIRONMENTAL AND SEASONAL ALLERGIES: ICD-10-CM

## 2024-05-27 DIAGNOSIS — I10 ESSENTIAL HYPERTENSION: Chronic | ICD-10-CM

## 2024-05-27 DIAGNOSIS — Z00.00 PREVENTATIVE HEALTH CARE: Primary | ICD-10-CM

## 2024-05-27 DIAGNOSIS — J45.30 MILD PERSISTENT ASTHMA WITHOUT COMPLICATION: Chronic | ICD-10-CM

## 2024-05-27 DIAGNOSIS — Z12.12 SCREENING FOR COLORECTAL CANCER: ICD-10-CM

## 2024-05-27 DIAGNOSIS — Z12.11 SCREENING FOR COLORECTAL CANCER: ICD-10-CM

## 2024-05-27 DIAGNOSIS — E66.09 CLASS 1 OBESITY DUE TO EXCESS CALORIES WITH SERIOUS COMORBIDITY AND BODY MASS INDEX (BMI) OF 30.0 TO 30.9 IN ADULT: Chronic | ICD-10-CM

## 2024-05-27 LAB
ALBUMIN SERPL BCP-MCNC: 4 G/DL (ref 3.5–5.2)
ALP SERPL-CCNC: 52 U/L (ref 55–135)
ALT SERPL W/O P-5'-P-CCNC: 17 U/L (ref 10–44)
ANION GAP SERPL CALC-SCNC: 8 MMOL/L (ref 8–16)
AST SERPL-CCNC: 20 U/L (ref 10–40)
BASOPHILS # BLD AUTO: 0.03 K/UL (ref 0–0.2)
BASOPHILS NFR BLD: 0.6 % (ref 0–1.9)
BILIRUB DIRECT SERPL-MCNC: 0.4 MG/DL (ref 0.1–0.3)
BILIRUB SERPL-MCNC: 1.2 MG/DL (ref 0.1–1)
BUN SERPL-MCNC: 16 MG/DL (ref 6–20)
CALCIUM SERPL-MCNC: 9.5 MG/DL (ref 8.7–10.5)
CHLORIDE SERPL-SCNC: 107 MMOL/L (ref 95–110)
CHOLEST SERPL-MCNC: 229 MG/DL (ref 120–199)
CHOLEST/HDLC SERPL: 3.5 {RATIO} (ref 2–5)
CO2 SERPL-SCNC: 25 MMOL/L (ref 23–29)
CREAT SERPL-MCNC: 1 MG/DL (ref 0.5–1.4)
DIFFERENTIAL METHOD BLD: ABNORMAL
EOSINOPHIL # BLD AUTO: 0.2 K/UL (ref 0–0.5)
EOSINOPHIL NFR BLD: 4 % (ref 0–8)
ERYTHROCYTE [DISTWIDTH] IN BLOOD BY AUTOMATED COUNT: 13.2 % (ref 11.5–14.5)
EST. GFR  (NO RACE VARIABLE): >60 ML/MIN/1.73 M^2
GLUCOSE SERPL-MCNC: 82 MG/DL (ref 70–110)
HCT VFR BLD AUTO: 47.1 % (ref 40–54)
HDLC SERPL-MCNC: 65 MG/DL (ref 40–75)
HDLC SERPL: 28.4 % (ref 20–50)
HGB BLD-MCNC: 14.9 G/DL (ref 14–18)
IMM GRANULOCYTES # BLD AUTO: 0.01 K/UL (ref 0–0.04)
IMM GRANULOCYTES NFR BLD AUTO: 0.2 % (ref 0–0.5)
INR PPP: 1.1 (ref 0.8–1.2)
LDLC SERPL CALC-MCNC: 155 MG/DL (ref 63–159)
LYMPHOCYTES # BLD AUTO: 1.5 K/UL (ref 1–4.8)
LYMPHOCYTES NFR BLD: 30.8 % (ref 18–48)
MCH RBC QN AUTO: 28.9 PG (ref 27–31)
MCHC RBC AUTO-ENTMCNC: 31.6 G/DL (ref 32–36)
MCV RBC AUTO: 92 FL (ref 82–98)
MONOCYTES # BLD AUTO: 0.3 K/UL (ref 0.3–1)
MONOCYTES NFR BLD: 6.7 % (ref 4–15)
NEUTROPHILS # BLD AUTO: 2.8 K/UL (ref 1.8–7.7)
NEUTROPHILS NFR BLD: 57.7 % (ref 38–73)
NONHDLC SERPL-MCNC: 164 MG/DL
NRBC BLD-RTO: 0 /100 WBC
PLATELET # BLD AUTO: 218 K/UL (ref 150–450)
PMV BLD AUTO: 11 FL (ref 9.2–12.9)
POTASSIUM SERPL-SCNC: 4.4 MMOL/L (ref 3.5–5.1)
PROT SERPL-MCNC: 7.8 G/DL (ref 6–8.4)
PROTHROMBIN TIME: 11.9 SEC (ref 9–12.5)
RBC # BLD AUTO: 5.15 M/UL (ref 4.6–6.2)
SODIUM SERPL-SCNC: 140 MMOL/L (ref 136–145)
TRIGL SERPL-MCNC: 45 MG/DL (ref 30–150)
WBC # BLD AUTO: 4.77 K/UL (ref 3.9–12.7)

## 2024-05-27 PROCEDURE — 36415 COLL VENOUS BLD VENIPUNCTURE: CPT | Performed by: NURSE PRACTITIONER

## 2024-05-27 PROCEDURE — 85610 PROTHROMBIN TIME: CPT | Performed by: NURSE PRACTITIONER

## 2024-05-27 PROCEDURE — 80061 LIPID PANEL: CPT | Performed by: FAMILY MEDICINE

## 2024-05-27 PROCEDURE — 87517 HEPATITIS B DNA QUANT: CPT | Performed by: NURSE PRACTITIONER

## 2024-05-27 PROCEDURE — 4010F ACE/ARB THERAPY RXD/TAKEN: CPT | Mod: CPTII,S$GLB,, | Performed by: FAMILY MEDICINE

## 2024-05-27 PROCEDURE — 3008F BODY MASS INDEX DOCD: CPT | Mod: CPTII,S$GLB,, | Performed by: FAMILY MEDICINE

## 2024-05-27 PROCEDURE — 1159F MED LIST DOCD IN RCRD: CPT | Mod: CPTII,S$GLB,, | Performed by: FAMILY MEDICINE

## 2024-05-27 PROCEDURE — 85025 COMPLETE CBC W/AUTO DIFF WBC: CPT | Performed by: NURSE PRACTITIONER

## 2024-05-27 PROCEDURE — 99214 OFFICE O/P EST MOD 30 MIN: CPT | Mod: 25,S$GLB,, | Performed by: FAMILY MEDICINE

## 2024-05-27 PROCEDURE — 3079F DIAST BP 80-89 MM HG: CPT | Mod: CPTII,S$GLB,, | Performed by: FAMILY MEDICINE

## 2024-05-27 PROCEDURE — 99396 PREV VISIT EST AGE 40-64: CPT | Mod: S$GLB,,, | Performed by: FAMILY MEDICINE

## 2024-05-27 PROCEDURE — 99999 PR PBB SHADOW E&M-EST. PATIENT-LVL IV: CPT | Mod: PBBFAC,,, | Performed by: FAMILY MEDICINE

## 2024-05-27 PROCEDURE — 1160F RVW MEDS BY RX/DR IN RCRD: CPT | Mod: CPTII,S$GLB,, | Performed by: FAMILY MEDICINE

## 2024-05-27 PROCEDURE — 80076 HEPATIC FUNCTION PANEL: CPT | Performed by: NURSE PRACTITIONER

## 2024-05-27 PROCEDURE — 80048 BASIC METABOLIC PNL TOTAL CA: CPT | Performed by: NURSE PRACTITIONER

## 2024-05-27 PROCEDURE — 3074F SYST BP LT 130 MM HG: CPT | Mod: CPTII,S$GLB,, | Performed by: FAMILY MEDICINE

## 2024-05-27 RX ORDER — LEVALBUTEROL TARTRATE 45 UG/1
1 AEROSOL, METERED ORAL EVERY 6 HOURS PRN
Qty: 15 G | Refills: 11 | Status: SHIPPED | OUTPATIENT
Start: 2024-05-27 | End: 2025-05-27

## 2024-05-27 RX ORDER — FLUTICASONE PROPIONATE 50 MCG
2 SPRAY, SUSPENSION (ML) NASAL DAILY
Qty: 32 G | Refills: 5 | Status: SHIPPED | OUTPATIENT
Start: 2024-05-27

## 2024-05-27 RX ORDER — FLUTICASONE PROPIONATE AND SALMETEROL 250; 50 UG/1; UG/1
1 POWDER RESPIRATORY (INHALATION) 2 TIMES DAILY
Qty: 180 EACH | Refills: 3 | Status: SHIPPED | OUTPATIENT
Start: 2024-05-27 | End: 2025-05-27

## 2024-05-27 RX ORDER — LOSARTAN POTASSIUM 50 MG/1
50 TABLET ORAL DAILY
Qty: 90 TABLET | Refills: 3 | Status: SHIPPED | OUTPATIENT
Start: 2024-05-27 | End: 2027-02-20

## 2024-05-27 NOTE — ASSESSMENT & PLAN NOTE
BP Readings from Last 6 Encounters:   05/27/24 120/88   11/27/23 137/89   11/22/23 137/89   05/29/23 128/84   05/02/23 126/86   03/24/22 122/88     Lab Results   Component Value Date    EGFRNORACEVR >60.0 11/22/2023    CREATININE 1.0 11/22/2023    BUN 12 11/22/2023    K 4.2 11/22/2023     11/22/2023     11/22/2023     Results for orders placed or performed during the hospital encounter of 03/24/22   SCHEDULED EKG 12-LEAD (to Muse)    Collection Time: 03/24/22  4:14 PM    Narrative    Test Reason : I10    Vent. Rate : 065 BPM     Atrial Rate : 065 BPM     P-R Int : 178 ms          QRS Dur : 090 ms      QT Int : 378 ms       P-R-T Axes : 073 050 044 degrees     QTc Int : 393 ms    Sinus rhythm with marked sinus arrythmia  Otherwise normal ECG  No previous ECGs available  Confirmed by LAURA BROUSSARD MD ([id number]) on 3/24/2022 6:23:37 PM    Referred By: CRISTIAN GARCES           Confirmed By:LAURA BROUSSARD MD

## 2024-05-27 NOTE — ASSESSMENT & PLAN NOTE
Lab Results   Component Value Date    HEPBSAB <3.00 05/02/2023    HEPBSAB Non-reactive 05/02/2023    HEPBSAG Reactive (A) 05/02/2023    HEPBIGM Non-reactive 05/02/2023    HBEAG Nonreactive 05/02/2023    HEPATITISBEA REACTIVE (A) 05/02/2023     Lab Results   Component Value Date    HEPATITISBVI DETECTED (A) 05/02/2023    HEPBDNA 3,560 (H) 05/02/2023    LOGHBVIUML 3.55 (H) 05/02/2023     Lab Results   Component Value Date    AST 19 11/22/2023    AST 16 05/02/2023    AST 15 03/24/2022    ALT 14 11/22/2023    ALT 16 05/02/2023    ALT 15 03/24/2022     Lab Results   Component Value Date    ALKPHOS 52 (L) 11/22/2023    ALKPHOS 54 (L) 05/02/2023    ALKPHOS 57 03/24/2022    BILITOT 0.9 11/22/2023    BILIDIR 0.3 11/22/2023    ALBUMIN 3.8 11/22/2023    LABPROT 11.8 11/22/2023    INR 1.1 11/22/2023   ASSESSMENT: Asymptomatic. Clinically stable.   PLAN: Check Hep B labs and liver US 5/28/24  Future Appointments   Date Time Provider Department Center   5/28/2024  8:00 AM [facility] US1 [facility]   5/28/2024  9:35 AM LABORATORY, [facility] LAB [facility]   8/20/2024 11:30 AM Nakul Levin MD [facility]  [facility]

## 2024-05-27 NOTE — ASSESSMENT & PLAN NOTE
Lab Results   Component Value Date    CHOL 225 (H) 11/24/2023    CHOL 251 (H) 05/02/2023    TRIG 53 11/24/2023    TRIG 84 05/02/2023    HDL 56 11/24/2023    HDL 54 05/02/2023    LDLCALC 158.4 11/24/2023    LDLCALC 180.2 (H) 05/02/2023    NONHDLCHOL 169 11/24/2023    NONHDLCHOL 197 05/02/2023    AST 19 11/22/2023    ALT 14 11/22/2023     The 10-year ASCVD risk score (Karrie PICKARD, et al., 2019) is: 5.7%    Values used to calculate the score:      Age: 43 years      Sex: Male      Is Non- : Yes      Diabetic: No      Tobacco smoker: No      Systolic Blood Pressure: 120 mmHg      Is BP treated: Yes      HDL Cholesterol: 56 mg/dL      Total Cholesterol: 225 mg/dL
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
## 2024-05-27 NOTE — ASSESSMENT & PLAN NOTE
"Wt Readings from Last 6 Encounters:   05/27/24 100.1 kg (220 lb 10.9 oz)   11/22/23 101.6 kg (223 lb 15.8 oz)   06/12/23 111.9 kg (246 lb 11.1 oz)   05/29/23 112.4 kg (247 lb 12.8 oz)   05/02/23 110.4 kg (243 lb 6.2 oz)   03/24/22 116.7 kg (257 lb 4.4 oz)     Estimated body mass index is 30.78 kg/m² as calculated from the following:    Height as of this encounter: 5' 11" (1.803 m).    Weight as of this encounter: 100.1 kg (220 lb 10.9 oz).    "

## 2024-05-28 ENCOUNTER — HOSPITAL ENCOUNTER (OUTPATIENT)
Dept: RADIOLOGY | Facility: HOSPITAL | Age: 44
Discharge: HOME | End: 2024-05-28
Attending: NURSE PRACTITIONER
Payer: COMMERCIAL

## 2024-05-28 DIAGNOSIS — B18.1 CHRONIC VIRAL HEPATITIS B: ICD-10-CM

## 2024-05-28 LAB
HEPATITIS B VIRUS DNA: ABNORMAL
HEPATITIS B VIRUS LOG (IU/ML): 3.98 LOGIU/ML
HEPATITIS B VIRUS PCR, QUANT: 9590 IU/ML

## 2024-05-28 PROCEDURE — 76705 ECHO EXAM OF ABDOMEN: CPT | Mod: 26,,, | Performed by: RADIOLOGY

## 2024-05-28 PROCEDURE — 76705 ECHO EXAM OF ABDOMEN: CPT | Mod: TC

## 2024-05-31 ENCOUNTER — PATIENT MESSAGE (OUTPATIENT)
Dept: GASTROENTEROLOGY | Facility: CLINIC | Age: 44
End: 2024-05-31
Payer: COMMERCIAL

## 2024-11-04 ENCOUNTER — HOSPITAL ENCOUNTER (OUTPATIENT)
Dept: PREADMISSION TESTING | Facility: HOSPITAL | Age: 44
Discharge: HOME | End: 2024-11-04
Attending: FAMILY MEDICINE
Payer: COMMERCIAL

## 2024-11-04 DIAGNOSIS — Z12.11 SCREENING FOR COLORECTAL CANCER: Primary | ICD-10-CM

## 2024-11-04 DIAGNOSIS — Z00.00 PREVENTATIVE HEALTH CARE: ICD-10-CM

## 2024-11-04 DIAGNOSIS — Z12.12 SCREENING FOR COLORECTAL CANCER: Primary | ICD-10-CM

## 2024-11-04 RX ORDER — SODIUM, POTASSIUM,MAG SULFATES 17.5-3.13G
1 SOLUTION, RECONSTITUTED, ORAL ORAL DAILY
Qty: 1 KIT | Refills: 0 | Status: SHIPPED | OUTPATIENT
Start: 2024-11-04 | End: 2024-11-06

## 2024-11-17 PROBLEM — Z12.11 COLON CANCER SCREENING: Status: ACTIVE | Noted: 2024-11-17

## 2024-11-18 ENCOUNTER — ANESTHESIA (OUTPATIENT)
Dept: ENDOSCOPY | Facility: HOSPITAL | Age: 44
End: 2024-11-18
Payer: COMMERCIAL

## 2024-11-18 ENCOUNTER — HOSPITAL ENCOUNTER (OUTPATIENT)
Facility: HOSPITAL | Age: 44
Discharge: HOME | End: 2024-11-18
Attending: INTERNAL MEDICINE | Admitting: INTERNAL MEDICINE
Payer: COMMERCIAL

## 2024-11-18 ENCOUNTER — ANESTHESIA EVENT (OUTPATIENT)
Dept: ENDOSCOPY | Facility: HOSPITAL | Age: 44
End: 2024-11-18
Payer: COMMERCIAL

## 2024-11-18 VITALS
RESPIRATION RATE: 16 BRPM | HEART RATE: 75 BPM | DIASTOLIC BLOOD PRESSURE: 65 MMHG | SYSTOLIC BLOOD PRESSURE: 124 MMHG | HEIGHT: 71 IN | WEIGHT: 222 LBS | TEMPERATURE: 98 F | BODY MASS INDEX: 31.08 KG/M2 | OXYGEN SATURATION: 97 %

## 2024-11-18 DIAGNOSIS — Z12.11 COLON CANCER SCREENING: ICD-10-CM

## 2024-11-18 PROCEDURE — 25000003 PHARM REV CODE 250: Performed by: INTERNAL MEDICINE

## 2024-11-18 PROCEDURE — G0121 COLON CA SCRN NOT HI RSK IND: HCPCS | Mod: ,,, | Performed by: INTERNAL MEDICINE

## 2024-11-18 PROCEDURE — G0121 COLON CA SCRN NOT HI RSK IND: HCPCS | Performed by: INTERNAL MEDICINE

## 2024-11-18 PROCEDURE — 37000008 HC ANESTHESIA 1ST 15 MINUTES: Performed by: INTERNAL MEDICINE

## 2024-11-18 PROCEDURE — 63600175 PHARM REV CODE 636 W HCPCS

## 2024-11-18 PROCEDURE — 37000009 HC ANESTHESIA EA ADD 15 MINS: Performed by: INTERNAL MEDICINE

## 2024-11-18 RX ORDER — DEXTROMETHORPHAN/PSEUDOEPHED 2.5-7.5/.8
DROPS ORAL
Status: COMPLETED | OUTPATIENT
Start: 2024-11-18 | End: 2024-11-18

## 2024-11-18 RX ORDER — PROPOFOL 10 MG/ML
VIAL (ML) INTRAVENOUS
Status: DISCONTINUED | OUTPATIENT
Start: 2024-11-18 | End: 2024-11-18

## 2024-11-18 RX ORDER — LIDOCAINE HYDROCHLORIDE 10 MG/ML
INJECTION, SOLUTION EPIDURAL; INFILTRATION; INTRACAUDAL; PERINEURAL
Status: DISCONTINUED | OUTPATIENT
Start: 2024-11-18 | End: 2024-11-18

## 2024-11-18 RX ADMIN — PROPOFOL 70 MG: 10 INJECTION, EMULSION INTRAVENOUS at 02:11

## 2024-11-18 RX ADMIN — PROPOFOL 50 MG: 10 INJECTION, EMULSION INTRAVENOUS at 02:11

## 2024-11-18 RX ADMIN — LIDOCAINE HYDROCHLORIDE 50 MG: 10 SOLUTION INTRAVENOUS at 02:11

## 2024-11-18 RX ADMIN — PROPOFOL 30 MG: 10 INJECTION, EMULSION INTRAVENOUS at 02:11

## 2024-11-18 NOTE — ANESTHESIA PREPROCEDURE EVALUATION
11/18/2024  Andrew Allen is a 44 y.o., male.    Patient Active Problem List   Diagnosis    Chronic ACTIVE viral hepatitis B    Essential hypertension    Mild persistent asthma without complication    Moderate mixed hyperlipidemia not requiring statin therapy    Environmental and seasonal allergies    Class 1 obesity due to excess calories with serious comorbidity in adult    Colon cancer screening     Past Medical History:   Diagnosis Date    Asthma     Childhood    Chronic viral hepatitis B 10/19/2019    Class 2 obesity due to excess calories with serious comorbidity and body mass index (BMI) of 36.0 to 36.9 (severe obesity) 3/21/2022    Essential hypertension 10/19/2019    Hepatitis B 2015    Hyperlipidemia 2011    Hypertension     Mild intermittent asthma without complication 10/19/2019    Mild persistent asthma without complication 10/19/2019     Past Surgical History:   Procedure Laterality Date    KNEE ARTHROSCOPY Right 1995    LIVER BIOPSY  2017       Pre-op Assessment    I have reviewed the Patient Summary Reports.     I have reviewed the Nursing Notes. I have reviewed the NPO Status.   I have reviewed the Medications.     Review of Systems  Anesthesia Hx:               Denies Personal Hx of Anesthesia complications.                    Social:  No Alcohol Use, Non-Smoker       Cardiovascular:  Exercise tolerance: good   Hypertension           hyperlipidemia                               Pulmonary:    Asthma     Childhood               Hepatic/GI:      Liver Disease, Hepatitis              Neurological:  Neurology Normal                                        Results for orders placed or performed during the hospital encounter of 03/24/22   SCHEDULED EKG 12-LEAD (to Muse)    Collection Time: 03/24/22  4:14 PM    Narrative    Test Reason : I10    Vent. Rate : 065 BPM     Atrial Rate : 065 BPM      P-R Int : 178 ms          QRS Dur : 090 ms      QT Int : 378 ms       P-R-T Axes : 073 050 044 degrees     QTc Int : 393 ms    Sinus rhythm with marked sinus arrythmia  Otherwise normal ECG  No previous ECGs available  Confirmed by LAURA BROUSSARD MD ([id number]) on 3/24/2022 6:23:37 PM    Referred By: CRISTIAN GARCES           Confirmed By:LAURA BROUSSARD MD         Physical Exam  General: Well nourished, Cooperative, Alert and Oriented    Airway:  Mallampati: II   Mouth Opening: Normal  TM Distance: Normal  Tongue: Normal  Neck ROM: Normal ROM    Dental:  Intact    Chest/Lungs:  Normal Respiratory Rate    Heart:  Rate: Normal  Rhythm: Regular Rhythm        Anesthesia Plan  Type of Anesthesia, risks & benefits discussed:    Anesthesia Type: MAC, Gen Natural Airway  Intra-op Monitoring Plan: Standard ASA Monitors  Induction:  IV  Informed Consent: Informed consent signed with the Patient and all parties understand the risks and agree with anesthesia plan.  All questions answered.   ASA Score: 2  Day of Surgery Review of History & Physical: H&P Update referred to the surgeon/provider.    Ready For Surgery From Anesthesia Perspective.     .

## 2024-11-18 NOTE — H&P
PRE PROCEDURE H&P    Patient Name: Andrew Allen  MRN: [id number]  : 1980  Date of Procedure:  2024  Referring Physician: ESVIN Mike MD  Primary Physician: ESVIN Mike MD  Procedure Physician: Kylee Shea MD       Planned Procedure: Colonoscopy  Diagnosis: screening for colon cancer      Chief Complaint: Same as above    HPI: Patient is an 44 y.o. male is here for the above. No previous colonoscopy. No fhx of CRC, no blood thinners. Wife at bedside.     Last colonoscopy: none  Family history: none  Anticoagulation: none    Past Medical History:   Past Medical History:   Diagnosis Date    Asthma     Childhood    Chronic viral hepatitis B 10/19/2019    Class 2 obesity due to excess calories with serious comorbidity and body mass index (BMI) of 36.0 to 36.9 (severe obesity) 3/21/2022    Essential hypertension 10/19/2019    Hepatitis B     Hyperlipidemia     Hypertension     Mild intermittent asthma without complication 10/19/2019    Mild persistent asthma without complication 10/19/2019        Past Surgical History:  Past Surgical History:   Procedure Laterality Date    KNEE ARTHROSCOPY Right     LIVER BIOPSY          Home Medications:  Prior to Admission medications    Medication Sig Start Date End Date Taking? Authorizing Provider   fluticasone-salmeterol 250-50 mcg/dose (WIXELA INHUB) 250-50 mcg/dose diskus inhaler Inhale 1 puff into the lungs 2 (two) times a day. Controller 24 Yes ESVIN Mike MD   levalbuterol (XOPENEX HFA) 45 mcg/actuation inhaler Inhale 1 puff into the lungs every 6 (six) hours as needed for Wheezing or Shortness of Breath. Rescue 24 Yes ESVIN Mike MD   losartan (COZAAR) 50 MG tablet Take 1 tablet (50 mg total) by mouth once daily. 24 Yes ESVIN Mike MD   multivitamin (THERAGRAN) per tablet Take 1 tablet by mouth once daily.   Yes Provider, Historical   fluticasone propionate  (FLONASE) 50 mcg/actuation nasal spray 2 sprays (100 mcg total) by Each Nostril route once daily. 5/27/24   ESVIN Mike MD        Allergies:  Review of patient's allergies indicates:  No Known Allergies     Social History:   Social History     Socioeconomic History    Marital status: Single    Number of children: 0   Occupational History    Occupation:      Employer: [employer]   Tobacco Use    Smoking status: Never    Smokeless tobacco: Never   Substance and Sexual Activity    Alcohol use: No    Sexual activity: Not Currently     Partners: Female     Social Drivers of Health     Financial Resource Strain: Low Risk  (11/27/2023)    Overall Financial Resource Strain (CARDIA)     Difficulty of Paying Living Expenses: Not hard at all   Food Insecurity: No Food Insecurity (11/27/2023)    Hunger Vital Sign     Worried About Running Out of Food in the Last Year: Never true     Ran Out of Food in the Last Year: Never true   Transportation Needs: No Transportation Needs (11/27/2023)    PRAPARE - Transportation     Lack of Transportation (Medical): No     Lack of Transportation (Non-Medical): No   Physical Activity: Unknown (11/27/2023)    Exercise Vital Sign     Days of Exercise per Week: 5 days   Stress: No Stress Concern Present (11/27/2023)    [employer] - Occupational Stress Questionnaire     Feeling of Stress : Only a little   Housing Stability: Unknown (11/27/2023)    Housing Stability Vital Sign     Unable to Pay for Housing in the Last Year: Patient refused     Number of Places Lived in the Last Year: 1     Unstable Housing in the Last Year: Patient refused       Family History:  Family History   Problem Relation Name Age of Onset    Hypertension Mother      Heart disease Father      Diabetes Father      Hypertension Father      Diabetes Maternal Grandmother      Heart failure Maternal Grandmother         ROS: No acute cardiac events, no acute respiratory  "complaints.     Physical Exam (all patients):    BP (!) 147/64   Pulse 68   Temp 97.9 °F (36.6 °C)   Resp 18   Ht 5' 11" (1.803 m)   Wt 100.7 kg (222 lb)   SpO2 99%   BMI 30.96 kg/m²   Lungs: Clear to auscultation bilaterally, respirations unlabored  Heart: Regular rate and rhythm, S1 and S2 normal, no obvious murmurs  Abdomen:         Soft, non-tender, bowel sounds normal, no masses, no organomegaly    Lab Results   Component Value Date    WBC 4.77 05/27/2024    MCV 92 05/27/2024    RDW 13.2 05/27/2024     05/27/2024    INR 1.1 05/27/2024    GLU 82 05/27/2024    HGBA1C 4.5 05/02/2023    BUN 16 05/27/2024     05/27/2024    K 4.4 05/27/2024     05/27/2024        SEDATION PLAN: per anesthesia      History reviewed, vital signs satisfactory, cardiopulmonary status satisfactory, sedation options, risks and plans have been discussed with the patient  All their questions were answered and the patient agrees to the sedation procedures as planned and the patient is deemed an appropriate candidate for the sedation as planned.    The risks, benefits and alternatives of the procedure were discussed with the patient in detail. This discussion was had in the presence of his wife and endoscopy staff. The risks include, risks of adverse reaction to sedation requiring the use of reversal agents, bleeding requiring blood transfusion, perforation requiring surgical intervention and technical failure. Other risks include aspiration leading to respiratory distress and respiratory failure resulting in endotracheal intubation and mechanical ventilation including death. If anesthesia is being utilized for this procedure, it is up to the anesthesiologist to determine airway safety including elective endotracheal intubation. Questions were answered, they agree to proceed. There was no language barriers.      Procedure explained to patient, informed consent obtained and placed in chart.    Kylee COLLINS" Shabbir  11/18/2024  2:08 PM

## 2024-11-18 NOTE — ANESTHESIA POSTPROCEDURE EVALUATION
Anesthesia Post Evaluation    Patient: Andrew Allen    Procedure(s) Performed: Procedure(s) (LRB):  COLONOSCOPY, SCREENING, LOW RISK PATIENT (N/A)    Final Anesthesia Type: MAC      Patient location during evaluation: GI PACU  Patient participation: Yes- Able to Participate  Level of consciousness: awake and alert  Post-procedure vital signs: reviewed and stable  Pain management: adequate  Airway patency: patent    PONV status at discharge: No PONV  Anesthetic complications: no      Cardiovascular status: blood pressure returned to baseline and hemodynamically stable  Respiratory status: unassisted, spontaneous ventilation and room air  Hydration status: euvolemic  Follow-up not needed.              Vitals Value Taken Time   /65 11/18/24 1432   Temp 36.6 °C (97.9 °F) 11/18/24 1422   Pulse 75 11/18/24 1432   Resp 16 11/18/24 1432   SpO2 97 % 11/18/24 1432         Event Time   Out of Recovery 14:47:50         Pain/Sophia Score: Sophia Score: 10 (11/18/2024  2:32 PM)

## 2024-11-18 NOTE — TRANSFER OF CARE
"Anesthesia Transfer of Care Note    Patient: Andrew Allen    Procedure(s) Performed: Procedure(s) (LRB):  COLONOSCOPY, SCREENING, LOW RISK PATIENT (N/A)    Patient location: GI    Anesthesia Type: MAC    Transport from OR: Transported from OR on room air with adequate spontaneous ventilation    Post pain: adequate analgesia    Post assessment: no apparent anesthetic complications    Post vital signs: stable    Level of consciousness: awake    Nausea/Vomiting: no nausea/vomiting    Complications: none    Transfer of care protocol was followed      Last vitals: Visit Vitals  BP (!) 147/64   Pulse 68   Temp 36.6 °C (97.9 °F)   Resp 18   Ht 5' 11" (1.803 m)   Wt 100.7 kg (222 lb)   SpO2 99%   BMI 30.96 kg/m²     "

## 2024-11-18 NOTE — PR[employer] PATIENT INSTRUCTIONS
Discharge Summary/Instructions after an Endoscopic Procedure  Patient Name: Andrew Allen  Patient MRN: [id number]  Patient YOB: 1980  Monday, November 18, 2024 Kylee Shea MD  Dear patient,  As a result of recent federal legislation (The Federal Cures Act), you may   receive lab or pathology results from your procedure in your [facility]   account before your physician is able to contact you. Your physician or   their representative will relay the results to you with their   recommendations at their soonest availability.  Thank you,  RESTRICTIONS:  During your procedure today, you received medications for sedation.  These   medications may affect your judgment, balance and coordination.  Therefore,   for 24 hours, you have the following restrictions:   - DO NOT drive a car, operate machinery, make legal/financial decisions,   sign important papers or drink alcohol.    ACTIVITY:  Today: no heavy lifting, straining or running due to procedural   sedation/anesthesia.  The following day: return to full activity including work.  DIET:  Eat and drink normally unless instructed otherwise.     TREATMENT FOR COMMON SIDE EFFECTS:  - Mild abdominal pain, nausea, belching, bloating or excessive gas:  rest,   eat lightly and use a heating pad.  - Sore Throat: treat with throat lozenges and/or gargle with warm salt   water.  - Because air was used during the procedure, expelling large amounts of air   from your rectum or belching is normal.  - If a bowel prep was taken, you may not have a bowel movement for 1-3 days.    This is normal.  SYMPTOMS TO WATCH FOR AND REPORT TO YOUR PHYSICIAN:  1. Abdominal pain or bloating, other than gas cramps.  2. Chest pain.  3. Back pain.  4. Signs of infection such as: chills or fever occurring within 24 hours   after the procedure.  5. Rectal bleeding, which would show as bright red, maroon, or black stools.   (A tablespoon of blood from the rectum is not serious, especially if    hemorrhoids are present.)  6. Vomiting.  7. Weakness or dizziness.  GO DIRECTLY TO THE NEAREST EMERGENCY ROOM IF YOU HAVE ANY OF THE FOLLOWING:      Difficulty breathing              Chills and/or fever over 101 F   Persistent vomiting and/or vomiting blood   Severe abdominal pain   Severe chest pain   Black, tarry stools   Bleeding- more than one tablespoon   Any other symptom or condition that you feel may need urgent attention  Your doctor recommends these additional instructions:  If any biopsies were taken, your doctors clinic will contact you in 1 to 2   weeks with any results.  - Discharge patient to home (with escort).   - Resume previous diet.   - Continue present medications.   - Repeat colonoscopy in 10 years for screening purposes.   - Return to primary care physician.  For questions, problems or results please call your physician Kylee Shea MD at Work:  [phone]  If you have any questions about the above instructions, call the GI   department at [phone] or call the endoscopy unit at [phone]   from 7am until 3 pm.  [facility] [place], EMERGENCY ROOM PHONE NUMBER:   [phone]  IF A COMPLICATION OR EMERGENCY SITUATION ARISES AND YOU ARE UNABLE TO REACH   YOUR PHYSICIAN - GO DIRECTLY TO THE EMERGENCY ROOM.  I have read or have had read to me these discharge instructions for my   procedure and have received a written copy.  I understand these   instructions and will follow-up with my physician if I have any questions.     __________________________________       _____________________________________  Nurse Signature                                          Patient/Designated   Responsible Party Signature  MD Kylee Aguirre MD  11/18/2024 2:26:33 PM  This report has been verified and signed electronically.  Dear patient,  As a result of recent federal legislation (The Federal Cures Act), you may   receive lab or pathology results from your  procedure in your [facility]   account before your physician is able to contact you. Your physician or   their representative will relay the results to you with their   recommendations at their soonest availability.  Thank you,  [employer]ATION

## 2024-11-19 ENCOUNTER — PATIENT MESSAGE (OUTPATIENT)
Dept: ADMINISTRATIVE | Facility: HOSPITAL | Age: 44
End: 2024-11-19
Payer: COMMERCIAL

## 2025-02-07 DIAGNOSIS — J45.30 MILD PERSISTENT ASTHMA WITHOUT COMPLICATION: Chronic | ICD-10-CM

## 2025-02-07 DIAGNOSIS — I10 ESSENTIAL HYPERTENSION: Chronic | ICD-10-CM

## 2025-02-07 NOTE — TELEPHONE ENCOUNTER
No care due was identified.  [facility] Embedded Care Due Messages. Reference number: [id number].   2/07/2025 5:37:26 PM CST

## 2025-02-10 RX ORDER — LOSARTAN POTASSIUM 50 MG/1
50 TABLET ORAL DAILY
Qty: 90 TABLET | Refills: 1 | Status: SHIPPED | OUTPATIENT
Start: 2025-02-10

## 2025-02-10 RX ORDER — FLUTICASONE PROPIONATE AND SALMETEROL 250; 50 UG/1; UG/1
1 POWDER RESPIRATORY (INHALATION) 2 TIMES DAILY
Qty: 180 EACH | Refills: 1 | Status: SHIPPED | OUTPATIENT
Start: 2025-02-10

## 2025-02-10 RX ORDER — LEVALBUTEROL TARTRATE 45 UG/1
1 AEROSOL, METERED ORAL EVERY 6 HOURS PRN
Qty: 45 G | Refills: 1 | Status: SHIPPED | OUTPATIENT
Start: 2025-02-10

## 2025-02-10 NOTE — TELEPHONE ENCOUNTER
Refill Decision Note   Andrew Allen  is requesting a refill authorization.  Brief Assessment and Rationale for Refill:        Medication Therapy Plan:         Comments:     Note composed:12:12 PM 02/10/2025

## 2025-02-11 ENCOUNTER — TELEPHONE (OUTPATIENT)
Dept: INTERNAL MEDICINE | Facility: CLINIC | Age: 45
End: 2025-02-11
Payer: COMMERCIAL

## 2025-03-04 ENCOUNTER — HOSPITAL ENCOUNTER (OUTPATIENT)
Dept: RADIOLOGY | Facility: HOSPITAL | Age: 45
Discharge: HOME | End: 2025-03-04
Attending: NURSE PRACTITIONER
Payer: COMMERCIAL

## 2025-03-04 ENCOUNTER — OFFICE VISIT (OUTPATIENT)
Dept: INTERNAL MEDICINE | Facility: CLINIC | Age: 45
End: 2025-03-04
Payer: COMMERCIAL

## 2025-03-04 VITALS
SYSTOLIC BLOOD PRESSURE: 134 MMHG | WEIGHT: 230.81 LBS | BODY MASS INDEX: 32.31 KG/M2 | DIASTOLIC BLOOD PRESSURE: 104 MMHG | HEIGHT: 71 IN | OXYGEN SATURATION: 96 % | HEART RATE: 104 BPM | TEMPERATURE: 97 F

## 2025-03-04 DIAGNOSIS — M25.511 ACUTE PAIN OF RIGHT SHOULDER: ICD-10-CM

## 2025-03-04 DIAGNOSIS — J45.30 MILD PERSISTENT ASTHMA WITHOUT COMPLICATION: Chronic | ICD-10-CM
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
## 2025-03-04 DIAGNOSIS — I10 ESSENTIAL HYPERTENSION: Chronic | ICD-10-CM

## 2025-03-04 DIAGNOSIS — Z13.1 SCREENING FOR DIABETES MELLITUS (DM): ICD-10-CM

## 2025-03-04 DIAGNOSIS — B18.1 CHRONIC VIRAL HEPATITIS B: ICD-10-CM

## 2025-03-04 DIAGNOSIS — M25.511 ACUTE PAIN OF RIGHT SHOULDER: Primary | ICD-10-CM

## 2025-03-04 LAB
ALBUMIN SERPL BCP-MCNC: 3.9 G/DL (ref 3.5–5.2)
ALP SERPL-CCNC: 51 U/L (ref 40–150)
ALT SERPL W/O P-5'-P-CCNC: 13 U/L (ref 10–44)
ANION GAP SERPL CALC-SCNC: 10 MMOL/L (ref 8–16)
AST SERPL-CCNC: 29 U/L (ref 10–40)
BILIRUB SERPL-MCNC: 0.8 MG/DL (ref 0.1–1)
BUN SERPL-MCNC: 12 MG/DL (ref 6–20)
CALCIUM SERPL-MCNC: 9.1 MG/DL (ref 8.7–10.5)
CHLORIDE SERPL-SCNC: 102 MMOL/L (ref 95–110)
CO2 SERPL-SCNC: 25 MMOL/L (ref 23–29)
CREAT SERPL-MCNC: 0.9 MG/DL (ref 0.5–1.4)
EST. GFR  (NO RACE VARIABLE): >60 ML/MIN/1.73 M^2
ESTIMATED AVG GLUCOSE: 82 MG/DL (ref 68–131)
GLUCOSE SERPL-MCNC: 85 MG/DL (ref 70–110)
HBA1C MFR BLD: 4.5 % (ref 4–5.6)
POTASSIUM SERPL-SCNC: 4.2 MMOL/L (ref 3.5–5.1)
PROT SERPL-MCNC: 7.5 G/DL (ref 6–8.4)
SODIUM SERPL-SCNC: 137 MMOL/L (ref 136–145)

## 2025-03-04 PROCEDURE — 3008F BODY MASS INDEX DOCD: CPT | Mod: CPTII,S$GLB,, | Performed by: NURSE PRACTITIONER

## 2025-03-04 PROCEDURE — 1160F RVW MEDS BY RX/DR IN RCRD: CPT | Mod: CPTII,S$GLB,, | Performed by: NURSE PRACTITIONER

## 2025-03-04 PROCEDURE — 3075F SYST BP GE 130 - 139MM HG: CPT | Mod: CPTII,S$GLB,, | Performed by: NURSE PRACTITIONER

## 2025-03-04 PROCEDURE — 4010F ACE/ARB THERAPY RXD/TAKEN: CPT | Mod: CPTII,S$GLB,, | Performed by: NURSE PRACTITIONER

## 2025-03-04 PROCEDURE — 73030 X-RAY EXAM OF SHOULDER: CPT | Mod: 26,RT,, | Performed by: RADIOLOGY

## 2025-03-04 PROCEDURE — 1159F MED LIST DOCD IN RCRD: CPT | Mod: CPTII,S$GLB,, | Performed by: NURSE PRACTITIONER

## 2025-03-04 PROCEDURE — G2211 COMPLEX E/M VISIT ADD ON: HCPCS | Mod: S$GLB,,, | Performed by: NURSE PRACTITIONER

## 2025-03-04 PROCEDURE — 83036 HEMOGLOBIN GLYCOSYLATED A1C: CPT | Performed by: NURSE PRACTITIONER

## 2025-03-04 PROCEDURE — 80053 COMPREHEN METABOLIC PANEL: CPT | Performed by: NURSE PRACTITIONER

## 2025-03-04 PROCEDURE — 99999 PR PBB SHADOW E&M-EST. PATIENT-LVL V: CPT | Mod: PBBFAC,,, | Performed by: NURSE PRACTITIONER

## 2025-03-04 PROCEDURE — 99214 OFFICE O/P EST MOD 30 MIN: CPT | Mod: S$GLB,,, | Performed by: NURSE PRACTITIONER

## 2025-03-04 PROCEDURE — 3080F DIAST BP >= 90 MM HG: CPT | Mod: CPTII,S$GLB,, | Performed by: NURSE PRACTITIONER

## 2025-03-04 PROCEDURE — 3044F HG A1C LEVEL LT 7.0%: CPT | Mod: CPTII,S$GLB,, | Performed by: NURSE PRACTITIONER

## 2025-03-04 PROCEDURE — 73030 X-RAY EXAM OF SHOULDER: CPT | Mod: TC,RT

## 2025-03-04 RX ORDER — NAPROXEN 500 MG/1
500 TABLET ORAL 2 TIMES DAILY WITH MEALS
Qty: 14 TABLET | Refills: 0 | Status: SHIPPED | OUTPATIENT
Start: 2025-03-04 | End: 2025-03-11
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
## 2025-03-14 ENCOUNTER — PATIENT MESSAGE (OUTPATIENT)
Dept: HEPATOLOGY | Facility: CLINIC | Age: 45
End: 2025-03-14
Payer: COMMERCIAL

## 2025-03-18 ENCOUNTER — CLINICAL SUPPORT (OUTPATIENT)
Dept: REHABILITATION | Facility: HOSPITAL | Age: 45
End: 2025-03-18
Payer: COMMERCIAL

## 2025-03-18 DIAGNOSIS — M25.611 SHOULDER STIFFNESS, RIGHT: ICD-10-CM

## 2025-03-18 DIAGNOSIS — R29.898 UPPER EXTREMITY WEAKNESS: ICD-10-CM

## 2025-03-18 DIAGNOSIS — M25.511 ACUTE PAIN OF RIGHT SHOULDER: Primary | ICD-10-CM
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
## 2025-03-18 PROCEDURE — 97162 PT EVAL MOD COMPLEX 30 MIN: CPT

## 2025-03-18 PROCEDURE — 97112 NEUROMUSCULAR REEDUCATION: CPT
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
## 2025-03-25 ENCOUNTER — CLINICAL SUPPORT (OUTPATIENT)
Dept: REHABILITATION | Facility: HOSPITAL | Age: 45
End: 2025-03-25
Payer: COMMERCIAL

## 2025-03-25 DIAGNOSIS — M25.511 ACUTE PAIN OF RIGHT SHOULDER: Primary | ICD-10-CM

## 2025-03-25 DIAGNOSIS — R29.898 UPPER EXTREMITY WEAKNESS: ICD-10-CM

## 2025-03-25 DIAGNOSIS — M25.611 SHOULDER STIFFNESS, RIGHT: ICD-10-CM

## 2025-03-25 PROCEDURE — 97530 THERAPEUTIC ACTIVITIES: CPT

## 2025-03-25 PROCEDURE — 97110 THERAPEUTIC EXERCISES: CPT

## 2025-03-25 PROCEDURE — 97112 NEUROMUSCULAR REEDUCATION: CPT
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
## 2025-03-27 ENCOUNTER — CLINICAL SUPPORT (OUTPATIENT)
Dept: REHABILITATION | Facility: HOSPITAL | Age: 45
End: 2025-03-27
Payer: COMMERCIAL

## 2025-03-27 DIAGNOSIS — R29.898 UPPER EXTREMITY WEAKNESS: ICD-10-CM

## 2025-03-27 DIAGNOSIS — M25.511 ACUTE PAIN OF RIGHT SHOULDER: Primary | ICD-10-CM

## 2025-03-27 DIAGNOSIS — M25.611 SHOULDER STIFFNESS, RIGHT: ICD-10-CM

## 2025-03-27 PROCEDURE — 97530 THERAPEUTIC ACTIVITIES: CPT

## 2025-03-27 PROCEDURE — 97112 NEUROMUSCULAR REEDUCATION: CPT

## 2025-03-27 PROCEDURE — 97110 THERAPEUTIC EXERCISES: CPT
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
## 2025-04-01 ENCOUNTER — CLINICAL SUPPORT (OUTPATIENT)
Dept: REHABILITATION | Facility: HOSPITAL | Age: 45
End: 2025-04-01
Payer: COMMERCIAL

## 2025-04-01 DIAGNOSIS — M25.611 SHOULDER STIFFNESS, RIGHT: ICD-10-CM

## 2025-04-01 DIAGNOSIS — M25.511 ACUTE PAIN OF RIGHT SHOULDER: Primary | ICD-10-CM

## 2025-04-01 DIAGNOSIS — R29.898 UPPER EXTREMITY WEAKNESS: ICD-10-CM

## 2025-04-01 PROCEDURE — 97110 THERAPEUTIC EXERCISES: CPT

## 2025-04-01 PROCEDURE — 97112 NEUROMUSCULAR REEDUCATION: CPT

## 2025-04-01 PROCEDURE — 97530 THERAPEUTIC ACTIVITIES: CPT
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
## 2025-04-03 ENCOUNTER — CLINICAL SUPPORT (OUTPATIENT)
Dept: REHABILITATION | Facility: HOSPITAL | Age: 45
End: 2025-04-03
Payer: COMMERCIAL

## 2025-04-03 DIAGNOSIS — M25.511 ACUTE PAIN OF RIGHT SHOULDER: Primary | ICD-10-CM

## 2025-04-03 DIAGNOSIS — R29.898 UPPER EXTREMITY WEAKNESS: ICD-10-CM

## 2025-04-03 DIAGNOSIS — M25.611 SHOULDER STIFFNESS, RIGHT: ICD-10-CM

## 2025-04-03 PROCEDURE — 97112 NEUROMUSCULAR REEDUCATION: CPT

## 2025-04-03 PROCEDURE — 97110 THERAPEUTIC EXERCISES: CPT

## 2025-04-03 PROCEDURE — 97530 THERAPEUTIC ACTIVITIES: CPT
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
## 2025-04-08 ENCOUNTER — CLINICAL SUPPORT (OUTPATIENT)
Dept: REHABILITATION | Facility: HOSPITAL | Age: 45
End: 2025-04-08
Payer: COMMERCIAL

## 2025-04-08 DIAGNOSIS — M25.611 SHOULDER STIFFNESS, RIGHT: ICD-10-CM

## 2025-04-08 DIAGNOSIS — R29.898 UPPER EXTREMITY WEAKNESS: ICD-10-CM

## 2025-04-08 DIAGNOSIS — M25.511 ACUTE PAIN OF RIGHT SHOULDER: Primary | ICD-10-CM

## 2025-04-08 PROCEDURE — 97530 THERAPEUTIC ACTIVITIES: CPT

## 2025-04-08 PROCEDURE — 97110 THERAPEUTIC EXERCISES: CPT

## 2025-04-08 PROCEDURE — 97112 NEUROMUSCULAR REEDUCATION: CPT
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
## 2025-04-15 ENCOUNTER — CLINICAL SUPPORT (OUTPATIENT)
Dept: REHABILITATION | Facility: HOSPITAL | Age: 45
End: 2025-04-15
Payer: COMMERCIAL

## 2025-04-15 DIAGNOSIS — M25.611 SHOULDER STIFFNESS, RIGHT: ICD-10-CM

## 2025-04-15 DIAGNOSIS — R29.898 UPPER EXTREMITY WEAKNESS: ICD-10-CM

## 2025-04-15 DIAGNOSIS — M25.511 ACUTE PAIN OF RIGHT SHOULDER: Primary | ICD-10-CM

## 2025-04-15 PROCEDURE — 97110 THERAPEUTIC EXERCISES: CPT

## 2025-04-15 PROCEDURE — 97530 THERAPEUTIC ACTIVITIES: CPT

## 2025-04-15 PROCEDURE — 97112 NEUROMUSCULAR REEDUCATION: CPT
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
## 2025-04-17 ENCOUNTER — CLINICAL SUPPORT (OUTPATIENT)
Dept: REHABILITATION | Facility: HOSPITAL | Age: 45
End: 2025-04-17
Payer: COMMERCIAL

## 2025-04-17 DIAGNOSIS — M25.611 SHOULDER STIFFNESS, RIGHT: ICD-10-CM

## 2025-04-17 DIAGNOSIS — R29.898 UPPER EXTREMITY WEAKNESS: ICD-10-CM

## 2025-04-17 DIAGNOSIS — M25.511 ACUTE PAIN OF RIGHT SHOULDER: Primary | ICD-10-CM

## 2025-04-17 PROCEDURE — 97110 THERAPEUTIC EXERCISES: CPT

## 2025-04-17 PROCEDURE — 97112 NEUROMUSCULAR REEDUCATION: CPT
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
## 2025-04-22 ENCOUNTER — CLINICAL SUPPORT (OUTPATIENT)
Dept: REHABILITATION | Facility: HOSPITAL | Age: 45
End: 2025-04-22
Payer: COMMERCIAL

## 2025-04-22 DIAGNOSIS — M25.511 ACUTE PAIN OF RIGHT SHOULDER: Primary | ICD-10-CM

## 2025-04-22 DIAGNOSIS — M25.611 SHOULDER STIFFNESS, RIGHT: ICD-10-CM

## 2025-04-22 DIAGNOSIS — R29.898 UPPER EXTREMITY WEAKNESS: ICD-10-CM

## 2025-04-22 PROCEDURE — 97530 THERAPEUTIC ACTIVITIES: CPT

## 2025-04-22 PROCEDURE — 97112 NEUROMUSCULAR REEDUCATION: CPT

## 2025-04-22 PROCEDURE — 97110 THERAPEUTIC EXERCISES: CPT
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
## 2025-04-24 ENCOUNTER — PATIENT MESSAGE (OUTPATIENT)
Dept: HEPATOLOGY | Facility: CLINIC | Age: 45
End: 2025-04-24
Payer: COMMERCIAL

## 2025-04-29 ENCOUNTER — CLINICAL SUPPORT (OUTPATIENT)
Dept: REHABILITATION | Facility: HOSPITAL | Age: 45
End: 2025-04-29
Payer: COMMERCIAL

## 2025-04-29 DIAGNOSIS — M25.511 ACUTE PAIN OF RIGHT SHOULDER: Primary | ICD-10-CM

## 2025-04-29 DIAGNOSIS — R29.898 UPPER EXTREMITY WEAKNESS: ICD-10-CM

## 2025-04-29 DIAGNOSIS — M25.611 SHOULDER STIFFNESS, RIGHT: ICD-10-CM

## 2025-04-29 PROCEDURE — 97110 THERAPEUTIC EXERCISES: CPT

## 2025-04-29 PROCEDURE — 97112 NEUROMUSCULAR REEDUCATION: CPT

## 2025-04-29 PROCEDURE — 97530 THERAPEUTIC ACTIVITIES: CPT
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
## 2025-05-01 ENCOUNTER — CLINICAL SUPPORT (OUTPATIENT)
Dept: REHABILITATION | Facility: HOSPITAL | Age: 45
End: 2025-05-01
Payer: COMMERCIAL

## 2025-05-01 DIAGNOSIS — M25.511 ACUTE PAIN OF RIGHT SHOULDER: Primary | ICD-10-CM

## 2025-05-01 DIAGNOSIS — M25.611 SHOULDER STIFFNESS, RIGHT: ICD-10-CM

## 2025-05-01 DIAGNOSIS — R29.898 UPPER EXTREMITY WEAKNESS: ICD-10-CM

## 2025-05-01 PROCEDURE — 97112 NEUROMUSCULAR REEDUCATION: CPT

## 2025-05-01 PROCEDURE — 97110 THERAPEUTIC EXERCISES: CPT

## 2025-05-01 PROCEDURE — 97530 THERAPEUTIC ACTIVITIES: CPT
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
## 2025-05-06 ENCOUNTER — CLINICAL SUPPORT (OUTPATIENT)
Dept: REHABILITATION | Facility: HOSPITAL | Age: 45
End: 2025-05-06
Payer: COMMERCIAL

## 2025-05-06 DIAGNOSIS — M25.511 ACUTE PAIN OF RIGHT SHOULDER: Primary | ICD-10-CM

## 2025-05-06 DIAGNOSIS — R29.898 UPPER EXTREMITY WEAKNESS: ICD-10-CM

## 2025-05-06 DIAGNOSIS — M25.611 SHOULDER STIFFNESS, RIGHT: ICD-10-CM

## 2025-05-06 PROCEDURE — 97530 THERAPEUTIC ACTIVITIES: CPT

## 2025-05-06 PROCEDURE — 97112 NEUROMUSCULAR REEDUCATION: CPT

## 2025-05-06 PROCEDURE — 97110 THERAPEUTIC EXERCISES: CPT
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
## 2025-05-08 ENCOUNTER — PATIENT MESSAGE (OUTPATIENT)
Dept: RESEARCH | Facility: HOSPITAL | Age: 45
End: 2025-05-08
Payer: COMMERCIAL

## 2025-05-09 ENCOUNTER — RESULTS FOLLOW-UP (OUTPATIENT)
Dept: INTERNAL MEDICINE | Facility: CLINIC | Age: 45
End: 2025-05-09

## 2025-05-11 ENCOUNTER — RESULTS FOLLOW-UP (OUTPATIENT)
Dept: INTERNAL MEDICINE | Facility: CLINIC | Age: 45
End: 2025-05-11

## 2025-05-27 ENCOUNTER — OFFICE VISIT (OUTPATIENT)
Dept: INTERNAL MEDICINE | Facility: CLINIC | Age: 45
End: 2025-05-27
Payer: COMMERCIAL

## 2025-05-27 ENCOUNTER — LAB VISIT (OUTPATIENT)
Dept: LAB | Facility: HOSPITAL | Age: 45
End: 2025-05-27
Attending: FAMILY MEDICINE
Payer: COMMERCIAL

## 2025-05-27 VITALS
OXYGEN SATURATION: 96 % | WEIGHT: 234.56 LBS | DIASTOLIC BLOOD PRESSURE: 84 MMHG | SYSTOLIC BLOOD PRESSURE: 132 MMHG | TEMPERATURE: 97 F | RESPIRATION RATE: 20 BRPM | BODY MASS INDEX: 32.72 KG/M2 | HEART RATE: 91 BPM

## 2025-05-27 DIAGNOSIS — E66.811 CLASS 1 OBESITY DUE TO EXCESS CALORIES WITH SERIOUS COMORBIDITY AND BODY MASS INDEX (BMI) OF 32.0 TO 32.9 IN ADULT: Chronic | ICD-10-CM

## 2025-05-27 DIAGNOSIS — J45.30 MILD PERSISTENT ASTHMA WITHOUT COMPLICATION: Chronic | ICD-10-CM

## 2025-05-27 DIAGNOSIS — I10 ESSENTIAL HYPERTENSION: Primary | Chronic | ICD-10-CM

## 2025-05-27 DIAGNOSIS — E66.09 CLASS 1 OBESITY DUE TO EXCESS CALORIES WITH SERIOUS COMORBIDITY AND BODY MASS INDEX (BMI) OF 32.0 TO 32.9 IN ADULT: Chronic | ICD-10-CM

## 2025-05-27 DIAGNOSIS — Z13.6 ENCOUNTER FOR LIPID SCREENING FOR CARDIOVASCULAR DISEASE: ICD-10-CM

## 2025-05-27 DIAGNOSIS — Z13.220 ENCOUNTER FOR LIPID SCREENING FOR CARDIOVASCULAR DISEASE: ICD-10-CM

## 2025-05-27 DIAGNOSIS — J30.89 ENVIRONMENTAL AND SEASONAL ALLERGIES: Chronic | ICD-10-CM

## 2025-05-27 LAB
CHOLEST SERPL-MCNC: 215 MG/DL (ref 120–199)
CHOLEST/HDLC SERPL: 3.6 {RATIO} (ref 2–5)
HDLC SERPL-MCNC: 60 MG/DL (ref 40–75)
HDLC SERPL: 27.9 % (ref 20–50)
LDLC SERPL CALC-MCNC: 141.8 MG/DL (ref 63–159)
NONHDLC SERPL-MCNC: 155 MG/DL
TRIGL SERPL-MCNC: 66 MG/DL (ref 30–150)

## 2025-05-27 PROCEDURE — 99999 PR PBB SHADOW E&M-EST. PATIENT-LVL IV: CPT | Mod: PBBFAC,,, | Performed by: FAMILY MEDICINE

## 2025-05-27 PROCEDURE — 80061 LIPID PANEL: CPT

## 2025-05-27 PROCEDURE — 3079F DIAST BP 80-89 MM HG: CPT | Mod: CPTII,S$GLB,, | Performed by: FAMILY MEDICINE

## 2025-05-27 PROCEDURE — 1159F MED LIST DOCD IN RCRD: CPT | Mod: CPTII,S$GLB,, | Performed by: FAMILY MEDICINE

## 2025-05-27 PROCEDURE — 36415 COLL VENOUS BLD VENIPUNCTURE: CPT

## 2025-05-27 PROCEDURE — 99214 OFFICE O/P EST MOD 30 MIN: CPT | Mod: S$GLB,,, | Performed by: FAMILY MEDICINE

## 2025-05-27 PROCEDURE — 3075F SYST BP GE 130 - 139MM HG: CPT | Mod: CPTII,S$GLB,, | Performed by: FAMILY MEDICINE

## 2025-05-27 PROCEDURE — 3008F BODY MASS INDEX DOCD: CPT | Mod: CPTII,S$GLB,, | Performed by: FAMILY MEDICINE

## 2025-05-27 PROCEDURE — 4010F ACE/ARB THERAPY RXD/TAKEN: CPT | Mod: CPTII,S$GLB,, | Performed by: FAMILY MEDICINE

## 2025-05-27 PROCEDURE — 3044F HG A1C LEVEL LT 7.0%: CPT | Mod: CPTII,S$GLB,, | Performed by: FAMILY MEDICINE

## 2025-05-27 PROCEDURE — 1160F RVW MEDS BY RX/DR IN RCRD: CPT | Mod: CPTII,S$GLB,, | Performed by: FAMILY MEDICINE

## 2025-05-27 PROCEDURE — G2211 COMPLEX E/M VISIT ADD ON: HCPCS | Mod: S$GLB,,, | Performed by: FAMILY MEDICINE

## 2025-05-27 RX ORDER — LOSARTAN POTASSIUM 50 MG/1
50 TABLET ORAL DAILY
Qty: 90 TABLET | Refills: 4 | Status: SHIPPED | OUTPATIENT
Start: 2025-05-27 | End: 2025-06-12 | Stop reason: SDUPTHER

## 2025-05-27 RX ORDER — FLUTICASONE PROPIONATE AND SALMETEROL 250; 50 UG/1; UG/1
1 POWDER RESPIRATORY (INHALATION) 2 TIMES DAILY
Qty: 180 EACH | Refills: 4 | Status: SHIPPED | OUTPATIENT
Start: 2025-05-27

## 2025-05-27 RX ORDER — FLUTICASONE PROPIONATE 50 MCG
2 SPRAY, SUSPENSION (ML) NASAL DAILY
Qty: 32 G | Refills: 5 | Status: SHIPPED | OUTPATIENT
Start: 2025-05-27

## 2025-05-27 RX ORDER — LEVALBUTEROL TARTRATE 45 UG/1
1 AEROSOL, METERED ORAL EVERY 6 HOURS PRN
Qty: 45 G | Refills: 4 | Status: SHIPPED | OUTPATIENT
Start: 2025-05-27

## 2025-05-27 NOTE — PROGRESS NOTES
OFFICE VISIT 5/27/25  2:00 PM CDT    CHIEF COMPLAINT: Annual Exam    Environmental and seasonal allergies: He uses fluticasone propionate (Flonase) 50 mcg/actuation nasal spray seasonally for allergy symptoms but has not taken it recently. The medication was continued at 2 sprays per nostril once daily. I discussed the importance of preventative treatment for allergies. Allergies were monitored through HPI and medication adherence review.    Essential hypertension: Blood pressure was evaluated with serial office readings: 132/84 today, 134/104 on 3/4/25, and generally well-controlled on prior assessments. He takes losartan (Cozaar) 50 mg tablet by mouth once daily, which was continued. He reports no side effects from this regimen. Assessment indicates that blood pressure is well-controlled on current medication.    Mild persistent asthma without complication: Asthma control was evaluated by HPI and medication usage. He reports well-controlled symptoms, uses fluticasone-salmeterol diskus inhaler 250-50 mcg as prescribed twice daily, and uses levalbuterol (Xopenex HFA) 45 mcg/actuation inhaler infrequently for rescue, having needed it only twice in the past month. Both asthma controller and rescue inhalers were continued. I emphasized the importance of preventative treatment for asthma. The assessment indicates asthma is well-controlled on current therapy.    Class 1 obesity due to excess calories with serious comorbidity and BMI of 32.0 to 32.9 in adult: Weight readings over the last six encounters demonstrate an increasing trend with a current weight of 106.4 kg (234 lb 9.1 oz) and BMI of 32.72 kg/m2. He reports eating later and choosing unhealthy foods, but recently obtained a gym membership to increase physical activity. I considered weight management options, including potential for GLP-1 agonist if insurance allows. An ambulatory referral to Nutrition Services was placed. Obesity risk and treatment options were  reviewed, and it was explained that obesity is a metabolic condition with medical as well as lifestyle management considerations.    Encounter for lipid screening for cardiovascular disease: Lipid screening was performed with a lipid panel drawn today. Lab results show cholesterol 215 mg/dL, triglycerides 66 mg/dL, HDL 60 mg/dL, .8 mg/dL. Lipid levels were previously elevated and remain above target today. Lipid screening is current, and results will guide ongoing cardiovascular risk assessment.    He was instructed to follow up for a virtual visit if struggling with weight loss to discuss treatment options and to contact the office regarding insurance coverage for dietitian nutritionist consultation.      1. Essential hypertension  -     losartan (COZAAR) 50 MG tablet; Take 1 tablet (50 mg total) by mouth once daily.  Dispense: 90 tablet; Refill: 4    2. Environmental and seasonal allergies  -     fluticasone propionate (FLONASE) 50 mcg/actuation nasal spray; 2 sprays (100 mcg total) by Each Nostril route once daily.  Dispense: 32 g; Refill: 5    3. Mild persistent asthma without complication  -     fluticasone-salmeterol diskus inhaler 250-50 mcg; Inhale 1 puff into the lungs 2 (two) times a day. Controller  Dispense: 180 each; Refill: 4  -     levalbuterol (XOPENEX HFA) 45 mcg/actuation inhaler; Inhale 1 puff into the lungs every 6 (six) hours as needed for Wheezing or Shortness of Breath. Rescue  Dispense: 45 g; Refill: 4    4. Class 1 obesity due to excess calories with serious comorbidity and body mass index (BMI) of 32.0 to 32.9 in adult  Assessment & Plan:  Wt Readings from Last 6 Encounters:   05/27/25 106.4 kg (234 lb 9.1 oz)   03/04/25 104.7 kg (230 lb 13.2 oz)   11/18/24 100.7 kg (222 lb)   05/27/24 100.1 kg (220 lb 10.9 oz)   11/22/23 101.6 kg (223 lb 15.8 oz)   06/12/23 111.9 kg (246 lb 11.1 oz)     Estimated body mass index is 32.72 kg/m² as calculated from the following:    Height as of  "3/4/25: 5' 11" (1.803 m).    Weight as of this encounter: 106.4 kg (234 lb 9.1 oz).      Orders:  -     Ambulatory referral/consult to Nutrition Services; Future; Expected date: 06/03/2025    5. Encounter for lipid screening for cardiovascular disease  -     Lipid Panel; Future; Expected date: 05/27/2025         Unless specified otherwise, chronic conditions are represented as and appear to be compensated/controlled and stable.  Today's visit involved the intricate management of episodic problem(s) and the ongoing care for the patient's serious or complex condition(s) listed above, reflecting the inherent complexity of providing longitudinal, comprehensive evaluation and management as the central hub for the patient's primary care services.    Except as noted herein, ROS is otherwise negative.    Vitals:    05/27/25 1353   BP: 132/84   BP Location: Right arm   Patient Position: Sitting   Pulse: 91   Resp: 20   Temp: 97.4 °F (36.3 °C)   TempSrc: Tympanic   SpO2: 96%   Weight: 106.4 kg (234 lb 9.1 oz)   Physical Exam  Vitals reviewed.   Constitutional:       General: He is not in acute distress.     Appearance: Normal appearance. He is not ill-appearing, toxic-appearing or diaphoretic.   HENT:      Head: Normocephalic and atraumatic.      Nose: Congestion present.      Mouth/Throat:      Pharynx: Oropharynx is clear.   Eyes:      General: No scleral icterus.     Conjunctiva/sclera: Conjunctivae normal.   Neck:      Thyroid: No thyroid mass, thyromegaly or thyroid tenderness.      Vascular: No carotid bruit.   Cardiovascular:      Rate and Rhythm: Normal rate and regular rhythm.   Pulmonary:      Effort: Pulmonary effort is normal.      Breath sounds: Normal breath sounds.   Skin:     General: Skin is warm and dry.   Neurological:      Mental Status: He is alert and oriented to person, place, and time. Mental status is at baseline.   Psychiatric:         Mood and Affect: Mood normal.         Behavior: Behavior normal.  "        Judgment: Judgment normal.       Documentation entered by me for this encounter may have been done in part using ambient-listening and speech-recognition technologies. I have reviewed the content for accuracy, though errors in syntax, spelling, or similar-sounding words may be present and should be interpreted in context. Please contact the author for any clarification.

## 2025-05-27 NOTE — ASSESSMENT & PLAN NOTE
"Wt Readings from Last 6 Encounters:   05/27/25 106.4 kg (234 lb 9.1 oz)   03/04/25 104.7 kg (230 lb 13.2 oz)   11/18/24 100.7 kg (222 lb)   05/27/24 100.1 kg (220 lb 10.9 oz)   11/22/23 101.6 kg (223 lb 15.8 oz)   06/12/23 111.9 kg (246 lb 11.1 oz)     Estimated body mass index is 32.72 kg/m² as calculated from the following:    Height as of 3/4/25: 5' 11" (1.803 m).    Weight as of this encounter: 106.4 kg (234 lb 9.1 oz).    "

## 2025-05-28 ENCOUNTER — PATIENT MESSAGE (OUTPATIENT)
Dept: INTERNAL MEDICINE | Facility: CLINIC | Age: 45
End: 2025-05-28
Payer: COMMERCIAL

## 2025-05-30 ENCOUNTER — RESULTS FOLLOW-UP (OUTPATIENT)
Dept: INTERNAL MEDICINE | Facility: CLINIC | Age: 45
End: 2025-05-30

## 2025-06-11 ENCOUNTER — TELEPHONE (OUTPATIENT)
Dept: PHARMACY | Facility: CLINIC | Age: 45
End: 2025-06-11
Payer: COMMERCIAL

## 2025-06-12 DIAGNOSIS — I10 ESSENTIAL HYPERTENSION: Chronic | ICD-10-CM

## 2025-06-12 RX ORDER — LOSARTAN POTASSIUM 50 MG/1
50 TABLET ORAL DAILY
Qty: 90 TABLET | Refills: 2 | Status: SHIPPED | OUTPATIENT
Start: 2025-06-12

## 2025-06-12 NOTE — TELEPHONE ENCOUNTER
Medication Adherence [facility]     I am reaching out to request a refill authorization for Mr. Allen for their prescribed medication listed below.     Hypertension Medications              losartan (COZAAR) 50 MG tablet Take 1 tablet (50 mg total) by mouth once daily.                Please send a refill over to [facility]. Let me know if any additional information is needed.     Lyn Moya  Clinical Pharmacist, [facility]     Encounter Provider ESVIN GARCES is live with [facility]:

## 2025-06-12 NOTE — TELEPHONE ENCOUNTER
Refill Decision Note   Andrew Alejandro  is requesting a refill authorization.  Brief Assessment and Rationale for Refill:  Approve     Medication Therapy Plan:        Comments:     Note composed:2:56 PM 06/12/2025

## 2025-06-12 NOTE — TELEPHONE ENCOUNTER
[facility] Chart Review & Patient Outreach Details For Medication Adherence Project     1.Reason for Outreach Encounter: 3rd Party payor non-compliance report ([employer], [employer], [employer], etc)  2.  Patient Outreach Method: YOGASMOGAhart message  3.   Medication in question: Losartan 50mg  LAST FILLED: 3/5/25 for 90 day supply    Hypertension Medications              losartan (COZAAR) 50 MG tablet Take 1 tablet (50 mg total) by mouth once daily.              4.  Reviewed and or Updates Made To: Patient Chart  5. Outreach Outcomes and/or actions taken: Sent inquiry to patient: Waiting for response.

## 2025-06-12 NOTE — TELEPHONE ENCOUNTER
No care due was identified.  [facility] Embedded Care Due Messages. Reference number: [id number].   6/12/2025 1:07:21 PM CDT

## 2025-06-12 NOTE — TELEPHONE ENCOUNTER
[facility] Chart Review & Patient Outreach Details For Medication Adherence Project    Reason for Outreach Encounter: 3rd Party payor non-compliance report ([employer], [employer], [employer], etc) and Follow up to a previous patient outreach  2.  Patient Outreach Method: Reviewed patient chart   3.   Medication in question:    Hypertension Medications              losartan (COZAAR) 50 MG tablet Take 1 tablet (50 mg total) by mouth once daily.                     4.  Reviewed and or Updates Made To: Patient Chart  5. Outreach Outcomes and/or actions taken: Patient requested refill to be sent to their pharmacy  Additional Notes:

## 2025-06-16 ENCOUNTER — DOCUMENTATION ONLY (OUTPATIENT)
Dept: REHABILITATION | Facility: HOSPITAL | Age: 45
End: 2025-06-16
Payer: COMMERCIAL
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
